# Patient Record
Sex: MALE | Race: WHITE | NOT HISPANIC OR LATINO | URBAN - METROPOLITAN AREA
[De-identification: names, ages, dates, MRNs, and addresses within clinical notes are randomized per-mention and may not be internally consistent; named-entity substitution may affect disease eponyms.]

---

## 2023-04-05 PROBLEM — T14.8XXA SKIN ABRASION: Status: ACTIVE | Noted: 2023-04-05

## 2023-04-05 PROBLEM — J20.9 ACUTE BRONCHITIS: Status: ACTIVE | Noted: 2023-04-05

## 2023-04-05 PROBLEM — S89.92XA INJURY OF LEFT LEG: Status: ACTIVE | Noted: 2023-04-05

## 2023-04-05 PROBLEM — J01.90 ACUTE SINUSITIS: Status: ACTIVE | Noted: 2023-04-05

## 2023-04-05 PROBLEM — N64.4 BREAST TENDERNESS IN MALE: Status: ACTIVE | Noted: 2023-04-05

## 2023-04-05 PROBLEM — B35.4 TINEA CORPORIS: Status: ACTIVE | Noted: 2023-04-05

## 2023-04-05 PROBLEM — E78.5 HYPERLIPIDEMIA: Status: ACTIVE | Noted: 2023-04-05

## 2023-04-05 PROBLEM — N52.9 INABILITY TO ATTAIN ERECTION: Status: ACTIVE | Noted: 2023-04-05

## 2023-04-05 PROBLEM — S80.12XA CONTUSION OF LEFT LOWER LEG: Status: ACTIVE | Noted: 2023-04-05

## 2023-04-05 PROBLEM — I10 BENIGN ESSENTIAL HYPERTENSION: Status: ACTIVE | Noted: 2023-04-05

## 2023-04-05 PROBLEM — E11.9 DIABETES MELLITUS (MULTI): Status: ACTIVE | Noted: 2023-04-05

## 2023-04-05 RX ORDER — LISINOPRIL 5 MG/1
5 TABLET ORAL
COMMUNITY
Start: 2017-12-21 | End: 2023-04-21 | Stop reason: SDUPTHER

## 2023-04-05 RX ORDER — SILDENAFIL 100 MG/1
100 TABLET, FILM COATED ORAL
COMMUNITY
Start: 2015-03-02

## 2023-04-05 RX ORDER — ATORVASTATIN CALCIUM 80 MG/1
1 TABLET, FILM COATED ORAL DAILY
COMMUNITY
Start: 2019-05-20 | End: 2024-03-12 | Stop reason: SDUPTHER

## 2023-04-05 RX ORDER — CICLOPIROX 80 MG/ML
SOLUTION TOPICAL
COMMUNITY
Start: 2021-10-18

## 2023-04-05 RX ORDER — BLOOD-GLUCOSE METER
KIT MISCELLANEOUS
COMMUNITY
Start: 2016-04-01 | End: 2024-03-12 | Stop reason: ALTCHOICE

## 2023-04-05 RX ORDER — CLOTRIMAZOLE AND BETAMETHASONE DIPROPIONATE 10; .64 MG/G; MG/G
CREAM TOPICAL
COMMUNITY
Start: 2021-10-18 | End: 2023-04-06 | Stop reason: ENTERED-IN-ERROR

## 2023-04-05 RX ORDER — METFORMIN HYDROCHLORIDE 1000 MG/1
1000 TABLET ORAL
COMMUNITY
Start: 2012-04-06 | End: 2023-04-21 | Stop reason: SDUPTHER

## 2023-04-05 RX ORDER — INSULIN GLARGINE 100 [IU]/ML
INJECTION, SOLUTION SUBCUTANEOUS NIGHTLY
COMMUNITY
Start: 2019-05-20

## 2023-04-06 ENCOUNTER — OFFICE VISIT (OUTPATIENT)
Dept: PRIMARY CARE | Facility: CLINIC | Age: 58
End: 2023-04-06
Payer: COMMERCIAL

## 2023-04-06 ENCOUNTER — TELEPHONE (OUTPATIENT)
Dept: PRIMARY CARE | Facility: CLINIC | Age: 58
End: 2023-04-06

## 2023-04-06 VITALS
WEIGHT: 184 LBS | SYSTOLIC BLOOD PRESSURE: 134 MMHG | DIASTOLIC BLOOD PRESSURE: 74 MMHG | BODY MASS INDEX: 26.4 KG/M2 | HEART RATE: 72 BPM

## 2023-04-06 DIAGNOSIS — Z00.00 HEALTH CARE MAINTENANCE: ICD-10-CM

## 2023-04-06 DIAGNOSIS — E78.2 MIXED HYPERLIPIDEMIA: ICD-10-CM

## 2023-04-06 DIAGNOSIS — I10 BENIGN ESSENTIAL HYPERTENSION: ICD-10-CM

## 2023-04-06 DIAGNOSIS — Z79.4 TYPE 2 DIABETES MELLITUS WITHOUT COMPLICATION, WITH LONG-TERM CURRENT USE OF INSULIN (MULTI): Primary | ICD-10-CM

## 2023-04-06 DIAGNOSIS — E11.9 TYPE 2 DIABETES MELLITUS WITHOUT COMPLICATION, WITH LONG-TERM CURRENT USE OF INSULIN (MULTI): Primary | ICD-10-CM

## 2023-04-06 PROBLEM — J20.9 ACUTE BRONCHITIS: Status: RESOLVED | Noted: 2023-04-05 | Resolved: 2023-04-06

## 2023-04-06 PROBLEM — S80.12XA CONTUSION OF LEFT LOWER LEG: Status: RESOLVED | Noted: 2023-04-05 | Resolved: 2023-04-06

## 2023-04-06 PROBLEM — J01.90 ACUTE SINUSITIS: Status: RESOLVED | Noted: 2023-04-05 | Resolved: 2023-04-06

## 2023-04-06 PROBLEM — N64.4 BREAST TENDERNESS IN MALE: Status: RESOLVED | Noted: 2023-04-05 | Resolved: 2023-04-06

## 2023-04-06 PROBLEM — B35.4 TINEA CORPORIS: Status: RESOLVED | Noted: 2023-04-05 | Resolved: 2023-04-06

## 2023-04-06 PROBLEM — S89.92XA INJURY OF LEFT LEG: Status: RESOLVED | Noted: 2023-04-05 | Resolved: 2023-04-06

## 2023-04-06 PROBLEM — T14.8XXA SKIN ABRASION: Status: RESOLVED | Noted: 2023-04-05 | Resolved: 2023-04-06

## 2023-04-06 LAB
POC FINGERSTICK BLOOD GLUCOSE: 122 MG/DL (ref 70–100)
POC HEMOGLOBIN A1C: 8 % (ref 4.2–6.5)

## 2023-04-06 PROCEDURE — 3078F DIAST BP <80 MM HG: CPT | Performed by: INTERNAL MEDICINE

## 2023-04-06 PROCEDURE — 3075F SYST BP GE 130 - 139MM HG: CPT | Performed by: INTERNAL MEDICINE

## 2023-04-06 PROCEDURE — 4010F ACE/ARB THERAPY RXD/TAKEN: CPT | Performed by: INTERNAL MEDICINE

## 2023-04-06 PROCEDURE — 90471 IMMUNIZATION ADMIN: CPT | Performed by: INTERNAL MEDICINE

## 2023-04-06 PROCEDURE — 90750 HZV VACC RECOMBINANT IM: CPT | Performed by: INTERNAL MEDICINE

## 2023-04-06 PROCEDURE — 99213 OFFICE O/P EST LOW 20 MIN: CPT | Performed by: INTERNAL MEDICINE

## 2023-04-06 PROCEDURE — 82962 GLUCOSE BLOOD TEST: CPT | Performed by: INTERNAL MEDICINE

## 2023-04-06 PROCEDURE — 83036 HEMOGLOBIN GLYCOSYLATED A1C: CPT | Performed by: INTERNAL MEDICINE

## 2023-04-06 RX ORDER — ALOGLIPTIN 25 MG/1
25 TABLET, FILM COATED ORAL DAILY
Qty: 30 TABLET | Refills: 11 | COMMUNITY
Start: 2023-04-06 | End: 2023-11-02 | Stop reason: SDUPTHER

## 2023-04-06 RX ORDER — EMPAGLIFLOZIN 10 MG/1
1 TABLET, FILM COATED ORAL DAILY
COMMUNITY
Start: 2023-03-20 | End: 2024-02-23 | Stop reason: SDUPTHER

## 2023-04-06 ASSESSMENT — ENCOUNTER SYMPTOMS: ARTHRALGIAS: 1

## 2023-04-06 NOTE — PROGRESS NOTES
Subjective   Patient ID: Thomas Zimmerman is a 57 y.o. male who presents for Diabetes.    Patient presents for follow-up.  He has been compliant with his medications and diet but not exercise.  He reports that his sugars are somewhat elevated at home  He has been complaining of right elbow pain.  He denies any headaches, no dizziness, no chest pain or shortness of breath.  Denies abdominal pain no nausea vomiting or diarrhea.  He reports no other new musculoskeletal complaints.    Diabetes         Review of Systems   Musculoskeletal:  Positive for arthralgias.       Objective   Wt 83.5 kg (184 lb)   BMI 26.40 kg/m²     Physical Exam  Constitutional:       Appearance: Normal appearance.   Cardiovascular:      Rate and Rhythm: Normal rate and regular rhythm.      Heart sounds: No murmur heard.     No gallop.   Pulmonary:      Effort: No respiratory distress.      Breath sounds: No wheezing or rales.   Abdominal:      General: There is no distension.      Palpations: There is no mass.      Tenderness: There is no abdominal tenderness. There is no guarding.   Musculoskeletal:      Right lower leg: No edema.      Left lower leg: No edema.   Neurological:      Mental Status: He is alert.         Assessment/Plan   Diagnoses and all orders for this visit:  Type 2 diabetes mellitus without complication, with long-term current use of insulin (CMS/[facility])- hemoglobin A1c was 8.0.  Encourage compliance with medication, diet and exercise.  We will increase insulin to 26 units.  We will keep appointment with endocrinology.  He will eat 3 meals a day and a snack at night.  Ophthalmology appointment has been done.  Risk of poor diabetic control explained.  -     alogliptin (Nesina) 25 mg tablet; Take 1 tablet (25 mg) by mouth once daily.  Benign essential hypertension-we will follow low-salt diet and exercise  Mixed hyperlipidemia-we will continue with atorvastatin  Other orders  -     Zoster vaccine, recombinant, adult  (SHINGRIX)  Health maintenance-we will give a Shingrix vaccine today.  Cologuard has been done.

## 2023-04-21 DIAGNOSIS — E11.69 TYPE 2 DIABETES MELLITUS WITH OTHER SPECIFIED COMPLICATION, UNSPECIFIED WHETHER LONG TERM INSULIN USE (MULTI): ICD-10-CM

## 2023-04-21 DIAGNOSIS — I10 BENIGN ESSENTIAL HYPERTENSION: ICD-10-CM

## 2023-04-21 RX ORDER — METFORMIN HYDROCHLORIDE 1000 MG/1
1000 TABLET ORAL
Qty: 90 TABLET | Refills: 2 | Status: SHIPPED | OUTPATIENT
Start: 2023-04-21 | End: 2023-04-27 | Stop reason: SDUPTHER

## 2023-04-21 RX ORDER — LISINOPRIL 5 MG/1
5 TABLET ORAL
Qty: 90 TABLET | Refills: 1 | Status: SHIPPED | OUTPATIENT
Start: 2023-04-21 | End: 2023-04-26 | Stop reason: SDUPTHER

## 2023-04-21 RX ORDER — METFORMIN HYDROCHLORIDE 1000 MG/1
1000 TABLET ORAL
Qty: 90 TABLET | Refills: 2 | Status: SHIPPED | OUTPATIENT
Start: 2023-04-21 | End: 2023-04-21

## 2023-04-26 DIAGNOSIS — I10 BENIGN ESSENTIAL HYPERTENSION: ICD-10-CM

## 2023-04-26 RX ORDER — LISINOPRIL 5 MG/1
5 TABLET ORAL
Qty: 90 TABLET | Refills: 1 | Status: SHIPPED | OUTPATIENT
Start: 2023-04-26 | End: 2024-03-12 | Stop reason: SDUPTHER

## 2023-04-27 DIAGNOSIS — E11.69 TYPE 2 DIABETES MELLITUS WITH OTHER SPECIFIED COMPLICATION, UNSPECIFIED WHETHER LONG TERM INSULIN USE (MULTI): ICD-10-CM

## 2023-04-27 RX ORDER — METFORMIN HYDROCHLORIDE 1000 MG/1
1000 TABLET ORAL
Qty: 90 TABLET | Refills: 2 | Status: SHIPPED | OUTPATIENT
Start: 2023-04-27 | End: 2023-04-28 | Stop reason: SDUPTHER

## 2023-04-27 NOTE — TELEPHONE ENCOUNTER
Pharmacy state that Thomas needs his Metformin 1000 sent to the [facility]  pharmacy

## 2023-04-28 DIAGNOSIS — E11.69 TYPE 2 DIABETES MELLITUS WITH OTHER SPECIFIED COMPLICATION, UNSPECIFIED WHETHER LONG TERM INSULIN USE (MULTI): ICD-10-CM

## 2023-04-28 RX ORDER — METFORMIN HYDROCHLORIDE 1000 MG/1
1000 TABLET ORAL
Qty: 90 TABLET | Refills: 2 | Status: SHIPPED | OUTPATIENT
Start: 2023-04-28

## 2023-04-28 RX ORDER — METFORMIN HYDROCHLORIDE 1000 MG/1
1000 TABLET ORAL
Qty: 180 TABLET | Refills: 2 | Status: SHIPPED | OUTPATIENT
Start: 2023-04-28 | End: 2023-05-01 | Stop reason: SDUPTHER

## 2023-05-01 DIAGNOSIS — E11.69 TYPE 2 DIABETES MELLITUS WITH OTHER SPECIFIED COMPLICATION, UNSPECIFIED WHETHER LONG TERM INSULIN USE (MULTI): ICD-10-CM

## 2023-05-01 RX ORDER — METFORMIN HYDROCHLORIDE 1000 MG/1
1000 TABLET ORAL
Qty: 180 TABLET | Refills: 2 | Status: SHIPPED | OUTPATIENT
Start: 2023-05-01 | End: 2024-03-12 | Stop reason: SDUPTHER

## 2023-07-13 ENCOUNTER — OFFICE VISIT (OUTPATIENT)
Dept: PRIMARY CARE | Facility: CLINIC | Age: 58
End: 2023-07-13
Payer: COMMERCIAL

## 2023-07-13 VITALS
TEMPERATURE: 97.3 F | SYSTOLIC BLOOD PRESSURE: 128 MMHG | BODY MASS INDEX: 26.26 KG/M2 | WEIGHT: 183 LBS | HEART RATE: 72 BPM | DIASTOLIC BLOOD PRESSURE: 72 MMHG

## 2023-07-13 DIAGNOSIS — I10 BENIGN ESSENTIAL HYPERTENSION: ICD-10-CM

## 2023-07-13 DIAGNOSIS — E78.2 MIXED HYPERLIPIDEMIA: Primary | ICD-10-CM

## 2023-07-13 DIAGNOSIS — E11.9 TYPE 2 DIABETES MELLITUS WITHOUT COMPLICATION, WITHOUT LONG-TERM CURRENT USE OF INSULIN (MULTI): ICD-10-CM

## 2023-07-13 LAB — POC HEMOGLOBIN A1C: 7 % (ref 4.2–6.5)

## 2023-07-13 PROCEDURE — 3078F DIAST BP <80 MM HG: CPT | Performed by: INTERNAL MEDICINE

## 2023-07-13 PROCEDURE — 4010F ACE/ARB THERAPY RXD/TAKEN: CPT | Performed by: INTERNAL MEDICINE

## 2023-07-13 PROCEDURE — 1036F TOBACCO NON-USER: CPT | Performed by: INTERNAL MEDICINE

## 2023-07-13 PROCEDURE — 99213 OFFICE O/P EST LOW 20 MIN: CPT | Performed by: INTERNAL MEDICINE

## 2023-07-13 PROCEDURE — 3074F SYST BP LT 130 MM HG: CPT | Performed by: INTERNAL MEDICINE

## 2023-07-13 PROCEDURE — 83036 HEMOGLOBIN GLYCOSYLATED A1C: CPT | Performed by: INTERNAL MEDICINE

## 2023-07-13 SDOH — HEALTH STABILITY: PHYSICAL HEALTH: ON AVERAGE, HOW MANY MINUTES DO YOU ENGAGE IN EXERCISE AT THIS LEVEL?: 20 MIN

## 2023-07-13 SDOH — HEALTH STABILITY: PHYSICAL HEALTH: ON AVERAGE, HOW MANY DAYS PER WEEK DO YOU ENGAGE IN MODERATE TO STRENUOUS EXERCISE (LIKE A BRISK WALK)?: 1 DAY

## 2023-07-13 ASSESSMENT — ENCOUNTER SYMPTOMS
CHEST TIGHTNESS: 0
ANAL BLEEDING: 0
FREQUENCY: 0
TROUBLE SWALLOWING: 0
POLYDIPSIA: 0
JOINT SWELLING: 0
NAUSEA: 0
COUGH: 0
SINUS PRESSURE: 0
CHILLS: 0
CONSTIPATION: 0
WEAKNESS: 0
WHEEZING: 0
SLEEP DISTURBANCE: 0
WOUND: 0
FLANK PAIN: 0
CHOKING: 0
HEADACHES: 0
HEMATURIA: 0
EYE REDNESS: 0
STRIDOR: 0
DYSURIA: 0
DIAPHORESIS: 0
SORE THROAT: 0
DIFFICULTY URINATING: 0
SINUS PAIN: 0
RHINORRHEA: 0
BLOOD IN STOOL: 0
ABDOMINAL PAIN: 0
ADENOPATHY: 0
EYE ITCHING: 0
ACTIVITY CHANGE: 0
FATIGUE: 0
COLOR CHANGE: 0
VOMITING: 0
EYE PAIN: 0
LIGHT-HEADEDNESS: 0
SEIZURES: 0
APPETITE CHANGE: 0
FACIAL ASYMMETRY: 0
PALPITATIONS: 0
ARTHRALGIAS: 0
PHOTOPHOBIA: 0
NUMBNESS: 0
NECK PAIN: 0
DIZZINESS: 0
DIARRHEA: 0
SPEECH DIFFICULTY: 0
RECTAL PAIN: 0
MYALGIAS: 0
BACK PAIN: 0
BRUISES/BLEEDS EASILY: 0
NECK STIFFNESS: 0
SHORTNESS OF BREATH: 0
FACIAL SWELLING: 0
EYE DISCHARGE: 0
VOICE CHANGE: 0
TREMORS: 0
POLYPHAGIA: 0
ABDOMINAL DISTENTION: 0

## 2023-07-13 ASSESSMENT — PATIENT HEALTH QUESTIONNAIRE - PHQ9
1. LITTLE INTEREST OR PLEASURE IN DOING THINGS: NOT AT ALL
SUM OF ALL RESPONSES TO PHQ9 QUESTIONS 1 & 2: 0
2. FEELING DOWN, DEPRESSED OR HOPELESS: NOT AT ALL

## 2023-07-13 ASSESSMENT — LIFESTYLE VARIABLES
HOW OFTEN DO YOU HAVE A DRINK CONTAINING ALCOHOL: MONTHLY OR LESS
HOW MANY STANDARD DRINKS CONTAINING ALCOHOL DO YOU HAVE ON A TYPICAL DAY: 1 OR 2
AUDIT-C TOTAL SCORE: 1
SKIP TO QUESTIONS 9-10: 1
HOW OFTEN DO YOU HAVE SIX OR MORE DRINKS ON ONE OCCASION: NEVER

## 2023-07-13 NOTE — PROGRESS NOTES
Subjective   Patient ID: Thomas Zimmerman is a 57 y.o. male who presents for Follow-up (Pt present today for 3 month follow up. ls).    Patient presents for follow-up.  He has been compliant with his medications, diet but not exercise.  He overall feels well.  He denies any headaches, no dizziness, no sinus problems, no chest pain or shortness of breath.  Denies abdominal pain no nausea vomiting or diarrhea.  He reports no new musculoskeletal complaints.         Review of Systems   Constitutional:  Negative for activity change, appetite change, chills, diaphoresis and fatigue.   HENT:  Negative for congestion, dental problem, drooling, ear discharge, ear pain, facial swelling, hearing loss, mouth sores, nosebleeds, postnasal drip, rhinorrhea, sinus pressure, sinus pain, sneezing, sore throat, tinnitus, trouble swallowing and voice change.    Eyes:  Negative for photophobia, pain, discharge, redness, itching and visual disturbance.   Respiratory:  Negative for cough, choking, chest tightness, shortness of breath, wheezing and stridor.    Cardiovascular:  Negative for chest pain, palpitations and leg swelling.   Gastrointestinal:  Negative for abdominal distention, abdominal pain, anal bleeding, blood in stool, constipation, diarrhea, nausea, rectal pain and vomiting.   Endocrine: Negative for cold intolerance, heat intolerance, polydipsia, polyphagia and polyuria.   Genitourinary:  Negative for decreased urine volume, difficulty urinating, dysuria, enuresis, flank pain, frequency, genital sores, hematuria and urgency.   Musculoskeletal:  Negative for arthralgias, back pain, gait problem, joint swelling, myalgias, neck pain and neck stiffness.   Skin:  Negative for color change, pallor, rash and wound.   Neurological:  Negative for dizziness, tremors, seizures, syncope, facial asymmetry, speech difficulty, weakness, light-headedness, numbness and headaches.   Hematological:  Negative for adenopathy. Does not bruise/bleed  easily.   Psychiatric/Behavioral:  Negative for sleep disturbance.        Objective   Temp 36.3 °C (97.3 °F)   Wt 83 kg (183 lb)   BMI 26.26 kg/m²     Physical Exam  Constitutional:       Appearance: Normal appearance.   Cardiovascular:      Rate and Rhythm: Normal rate and regular rhythm.      Heart sounds: No murmur heard.     No gallop.   Pulmonary:      Effort: No respiratory distress.      Breath sounds: No wheezing or rales.   Abdominal:      General: There is no distension.      Palpations: There is no mass.      Tenderness: There is no abdominal tenderness. There is no guarding.   Musculoskeletal:      Right lower leg: No edema.      Left lower leg: No edema.   Neurological:      Mental Status: He is alert.         Assessment/Plan   Diagnoses and all orders for this visit:  Mixed hyperlipidemia--we will recheck lipids at next visit.  We will continue with statin  Benign essential hypertension-low-salt diet and exercise  Type 2 diabetes mellitus without complication, without long-term current use of insulin (CMS/[facility])-hemoglobin A1c was 7.0.  Improved.  Ophthalmology appointment has been done.  Health maintenance-Cologuard has been done..  Immunizations are up-to-date.  Dental appointment has been done.

## 2023-10-16 ENCOUNTER — LAB (OUTPATIENT)
Dept: LAB | Facility: LAB | Age: 58
End: 2023-10-16
Payer: COMMERCIAL

## 2023-10-16 ENCOUNTER — OFFICE VISIT (OUTPATIENT)
Dept: PRIMARY CARE | Facility: CLINIC | Age: 58
End: 2023-10-16
Payer: COMMERCIAL

## 2023-10-16 VITALS
SYSTOLIC BLOOD PRESSURE: 124 MMHG | WEIGHT: 185 LBS | BODY MASS INDEX: 26.54 KG/M2 | HEART RATE: 72 BPM | DIASTOLIC BLOOD PRESSURE: 74 MMHG

## 2023-10-16 DIAGNOSIS — E11.9 TYPE 2 DIABETES MELLITUS WITHOUT COMPLICATION, WITHOUT LONG-TERM CURRENT USE OF INSULIN (MULTI): ICD-10-CM

## 2023-10-16 DIAGNOSIS — Z23 NEED FOR INFLUENZA VACCINATION: ICD-10-CM

## 2023-10-16 DIAGNOSIS — Z00.00 HEALTH CARE MAINTENANCE: ICD-10-CM

## 2023-10-16 DIAGNOSIS — Z00.00 HEALTH CARE MAINTENANCE: Primary | ICD-10-CM

## 2023-10-16 DIAGNOSIS — I10 BENIGN ESSENTIAL HYPERTENSION: ICD-10-CM

## 2023-10-16 DIAGNOSIS — E78.2 MIXED HYPERLIPIDEMIA: ICD-10-CM

## 2023-10-16 LAB
25(OH)D3 SERPL-MCNC: 74 NG/ML (ref 30–100)
ALBUMIN SERPL BCP-MCNC: 4.4 G/DL (ref 3.4–5)
ALP SERPL-CCNC: 97 U/L (ref 33–120)
ALT SERPL W P-5'-P-CCNC: 25 U/L (ref 10–52)
ANION GAP SERPL CALC-SCNC: 15 MMOL/L (ref 10–20)
APPEARANCE UR: CLEAR
AST SERPL W P-5'-P-CCNC: 14 U/L (ref 9–39)
BASOPHILS # BLD AUTO: 0.04 X10*3/UL (ref 0–0.1)
BASOPHILS NFR BLD AUTO: 0.5 %
BILIRUB SERPL-MCNC: 0.4 MG/DL (ref 0–1.2)
BILIRUB UR STRIP.AUTO-MCNC: NEGATIVE MG/DL
BUN SERPL-MCNC: 20 MG/DL (ref 6–23)
CALCIUM SERPL-MCNC: 9.8 MG/DL (ref 8.6–10.6)
CHLORIDE SERPL-SCNC: 103 MMOL/L (ref 98–107)
CHOLEST SERPL-MCNC: 127 MG/DL (ref 0–199)
CHOLESTEROL/HDL RATIO: 3.1
CO2 SERPL-SCNC: 27 MMOL/L (ref 21–32)
COLOR UR: YELLOW
CREAT SERPL-MCNC: 0.82 MG/DL (ref 0.5–1.3)
CREAT UR-MCNC: 89 MG/DL (ref 20–370)
EOSINOPHIL # BLD AUTO: 0.12 X10*3/UL (ref 0–0.7)
EOSINOPHIL NFR BLD AUTO: 1.6 %
ERYTHROCYTE [DISTWIDTH] IN BLOOD BY AUTOMATED COUNT: 12.1 % (ref 11.5–14.5)
EST. AVERAGE GLUCOSE BLD GHB EST-MCNC: 160 MG/DL
GFR SERPL CREATININE-BSD FRML MDRD: >90 ML/MIN/1.73M*2
GLUCOSE SERPL-MCNC: 111 MG/DL (ref 74–99)
GLUCOSE UR STRIP.AUTO-MCNC: ABNORMAL MG/DL
HBA1C MFR BLD: 7.2 %
HCT VFR BLD AUTO: 52.1 % (ref 41–52)
HDLC SERPL-MCNC: 41.2 MG/DL
HGB BLD-MCNC: 16.6 G/DL (ref 13.5–17.5)
IMM GRANULOCYTES # BLD AUTO: 0.03 X10*3/UL (ref 0–0.7)
IMM GRANULOCYTES NFR BLD AUTO: 0.4 % (ref 0–0.9)
KETONES UR STRIP.AUTO-MCNC: NEGATIVE MG/DL
LDLC SERPL CALC-MCNC: 63 MG/DL
LEUKOCYTE ESTERASE UR QL STRIP.AUTO: NEGATIVE
LYMPHOCYTES # BLD AUTO: 1.58 X10*3/UL (ref 1.2–4.8)
LYMPHOCYTES NFR BLD AUTO: 20.9 %
MCH RBC QN AUTO: 30.3 PG (ref 26–34)
MCHC RBC AUTO-ENTMCNC: 31.9 G/DL (ref 32–36)
MCV RBC AUTO: 95 FL (ref 80–100)
MICROALBUMIN UR-MCNC: <7 MG/L
MICROALBUMIN/CREAT UR: NORMAL MG/G{CREAT}
MONOCYTES # BLD AUTO: 0.84 X10*3/UL (ref 0.1–1)
MONOCYTES NFR BLD AUTO: 11.1 %
NEUTROPHILS # BLD AUTO: 4.95 X10*3/UL (ref 1.2–7.7)
NEUTROPHILS NFR BLD AUTO: 65.5 %
NITRITE UR QL STRIP.AUTO: NEGATIVE
NON HDL CHOLESTEROL: 86 MG/DL (ref 0–149)
NRBC BLD-RTO: 0 /100 WBCS (ref 0–0)
PH UR STRIP.AUTO: 5 [PH]
PLATELET # BLD AUTO: 227 X10*3/UL (ref 150–450)
PMV BLD AUTO: 11.8 FL (ref 7.5–11.5)
POTASSIUM SERPL-SCNC: 4.2 MMOL/L (ref 3.5–5.3)
PROT SERPL-MCNC: 6.5 G/DL (ref 6.4–8.2)
PROT UR STRIP.AUTO-MCNC: NEGATIVE MG/DL
PSA SERPL-MCNC: 1.41 NG/ML
RBC # BLD AUTO: 5.47 X10*6/UL (ref 4.5–5.9)
RBC # UR STRIP.AUTO: NEGATIVE /UL
SODIUM SERPL-SCNC: 141 MMOL/L (ref 136–145)
SP GR UR STRIP.AUTO: 1.03
TRIGL SERPL-MCNC: 112 MG/DL (ref 0–149)
TSH SERPL-ACNC: 1.58 MIU/L (ref 0.44–3.98)
URATE SERPL-MCNC: 4 MG/DL (ref 4–7.5)
UROBILINOGEN UR STRIP.AUTO-MCNC: <2 MG/DL
VLDL: 22 MG/DL (ref 0–40)
WBC # BLD AUTO: 7.6 X10*3/UL (ref 4.4–11.3)

## 2023-10-16 PROCEDURE — 90686 IIV4 VACC NO PRSV 0.5 ML IM: CPT | Performed by: INTERNAL MEDICINE

## 2023-10-16 PROCEDURE — 85025 COMPLETE CBC W/AUTO DIFF WBC: CPT

## 2023-10-16 PROCEDURE — 3074F SYST BP LT 130 MM HG: CPT | Performed by: INTERNAL MEDICINE

## 2023-10-16 PROCEDURE — 4010F ACE/ARB THERAPY RXD/TAKEN: CPT | Performed by: INTERNAL MEDICINE

## 2023-10-16 PROCEDURE — 3078F DIAST BP <80 MM HG: CPT | Performed by: INTERNAL MEDICINE

## 2023-10-16 PROCEDURE — 83036 HEMOGLOBIN GLYCOSYLATED A1C: CPT

## 2023-10-16 PROCEDURE — 81003 URINALYSIS AUTO W/O SCOPE: CPT

## 2023-10-16 PROCEDURE — 3048F LDL-C <100 MG/DL: CPT | Performed by: INTERNAL MEDICINE

## 2023-10-16 PROCEDURE — 80061 LIPID PANEL: CPT

## 2023-10-16 PROCEDURE — 82570 ASSAY OF URINE CREATININE: CPT

## 2023-10-16 PROCEDURE — 84550 ASSAY OF BLOOD/URIC ACID: CPT

## 2023-10-16 PROCEDURE — 82306 VITAMIN D 25 HYDROXY: CPT

## 2023-10-16 PROCEDURE — 84443 ASSAY THYROID STIM HORMONE: CPT

## 2023-10-16 PROCEDURE — 90471 IMMUNIZATION ADMIN: CPT | Performed by: INTERNAL MEDICINE

## 2023-10-16 PROCEDURE — 1036F TOBACCO NON-USER: CPT | Performed by: INTERNAL MEDICINE

## 2023-10-16 PROCEDURE — 84153 ASSAY OF PSA TOTAL: CPT

## 2023-10-16 PROCEDURE — 3051F HG A1C>EQUAL 7.0%<8.0%: CPT | Performed by: INTERNAL MEDICINE

## 2023-10-16 PROCEDURE — 82043 UR ALBUMIN QUANTITATIVE: CPT

## 2023-10-16 PROCEDURE — 36415 COLL VENOUS BLD VENIPUNCTURE: CPT

## 2023-10-16 PROCEDURE — 3062F POS MACROALBUMINURIA REV: CPT | Performed by: INTERNAL MEDICINE

## 2023-10-16 PROCEDURE — 99213 OFFICE O/P EST LOW 20 MIN: CPT | Performed by: INTERNAL MEDICINE

## 2023-10-16 PROCEDURE — 80053 COMPREHEN METABOLIC PANEL: CPT

## 2023-10-16 ASSESSMENT — ENCOUNTER SYMPTOMS
EYE ITCHING: 0
WEAKNESS: 0
POLYPHAGIA: 0
LIGHT-HEADEDNESS: 0
COUGH: 0
NAUSEA: 0
FACIAL SWELLING: 0
RECTAL PAIN: 0
JOINT SWELLING: 0
EYE REDNESS: 0
ARTHRALGIAS: 0
DYSURIA: 0
VOICE CHANGE: 0
FACIAL ASYMMETRY: 0
ADENOPATHY: 0
EYE PAIN: 0
POLYDIPSIA: 0
NUMBNESS: 0
FATIGUE: 0
FREQUENCY: 0
HEMATURIA: 0
WHEEZING: 0
PALPITATIONS: 0
SEIZURES: 0
DIAPHORESIS: 0
VOMITING: 0
SPEECH DIFFICULTY: 0
CHOKING: 0
TREMORS: 0
ABDOMINAL DISTENTION: 0
CONSTIPATION: 0
APPETITE CHANGE: 0
TROUBLE SWALLOWING: 0
DIZZINESS: 0
SINUS PRESSURE: 0
RHINORRHEA: 0
COLOR CHANGE: 0
NECK PAIN: 0
DIARRHEA: 0
BLOOD IN STOOL: 0
HEADACHES: 0
SINUS PAIN: 0
BRUISES/BLEEDS EASILY: 0
SLEEP DISTURBANCE: 0
STRIDOR: 0
DIFFICULTY URINATING: 0
ABDOMINAL PAIN: 0
BACK PAIN: 0
WOUND: 0
FLANK PAIN: 0
ANAL BLEEDING: 0
CHEST TIGHTNESS: 0
ACTIVITY CHANGE: 0
NECK STIFFNESS: 0
SHORTNESS OF BREATH: 0
CHILLS: 0
MYALGIAS: 0
PHOTOPHOBIA: 0
EYE DISCHARGE: 0
SORE THROAT: 0

## 2023-10-16 NOTE — PROGRESS NOTES
Subjective   Patient ID: Thomas Zimmerman is a 58 y.o. male who presents for No chief complaint on file..    Patient presents for follow-up.  He has been compliant with his medications, diet but not exercise.  He overall feels well.  He denies any headaches, no dizziness, no sinus problems, no chest pain or shortness of breath.  He denies abdominal pain no nausea vomiting or diarrhea.  He reports no new musculoskeletal complaints.         Review of Systems   Constitutional:  Negative for activity change, appetite change, chills, diaphoresis and fatigue.   HENT:  Negative for congestion, dental problem, drooling, ear discharge, ear pain, facial swelling, hearing loss, mouth sores, nosebleeds, postnasal drip, rhinorrhea, sinus pressure, sinus pain, sneezing, sore throat, tinnitus, trouble swallowing and voice change.    Eyes:  Negative for photophobia, pain, discharge, redness, itching and visual disturbance.   Respiratory:  Negative for cough, choking, chest tightness, shortness of breath, wheezing and stridor.    Cardiovascular:  Negative for chest pain, palpitations and leg swelling.   Gastrointestinal:  Negative for abdominal distention, abdominal pain, anal bleeding, blood in stool, constipation, diarrhea, nausea, rectal pain and vomiting.   Endocrine: Negative for cold intolerance, heat intolerance, polydipsia, polyphagia and polyuria.   Genitourinary:  Negative for decreased urine volume, difficulty urinating, dysuria, enuresis, flank pain, frequency, genital sores, hematuria and urgency.   Musculoskeletal:  Negative for arthralgias, back pain, gait problem, joint swelling, myalgias, neck pain and neck stiffness.   Skin:  Negative for color change, pallor, rash and wound.   Neurological:  Negative for dizziness, tremors, seizures, syncope, facial asymmetry, speech difficulty, weakness, light-headedness, numbness and headaches.   Hematological:  Negative for adenopathy. Does not bruise/bleed easily.    Psychiatric/Behavioral:  Negative for sleep disturbance.        Objective   There were no vitals taken for this visit.    Physical Exam  Constitutional:       Appearance: Normal appearance.   Cardiovascular:      Rate and Rhythm: Normal rate and regular rhythm.      Heart sounds: No murmur heard.     No gallop.   Pulmonary:      Effort: No respiratory distress.      Breath sounds: No wheezing or rales.   Abdominal:      General: There is no distension.      Palpations: There is no mass.      Tenderness: There is no abdominal tenderness. There is no guarding.   Musculoskeletal:      Right lower leg: No edema.      Left lower leg: No edema.   Neurological:      Mental Status: He is alert.         Assessment/Plan   Diagnoses and all orders for this visit:  Health care maintenance-we will give a flu shot today..  Cologuard has been done.  -     Albumin , Urine Random; Future  -     Vitamin D 25-Hydroxy,Total (for eval of Vitamin D levels); Future  -     CBC and Auto Differential; Future  -     Comprehensive Metabolic Panel; Future  -     Prostate Specific Antigen; Future  -     TSH with reflex to Free T4 if abnormal; Future  -     Uric Acid; Future  -     Urinalysis with Reflex Microscopic; Future  -     Lipid Panel; Future  -     Hemoglobin A1c; Future  Type 2 diabetes mellitus without complication, without long-term current use of insulin (CMS/[facility])-we will check hemoglobin A1c.  Ophthalmology appointment is pending  -     Hemoglobin A1c; Future  Need for influenza vaccination  -     Flu vaccine (IIV4) age 6 months and greater, preservative free  Mixed hyperlipidemia-check a fast lipid profile  Benign essential hypertension-stable on present medication

## 2023-10-16 NOTE — PATIENT INSTRUCTIONS
Please take medication as prescribed.  Follow-up in 3 months.  Obtain fasting blood work and urine

## 2023-11-02 DIAGNOSIS — E11.9 TYPE 2 DIABETES MELLITUS WITHOUT COMPLICATION, WITH LONG-TERM CURRENT USE OF INSULIN (MULTI): ICD-10-CM

## 2023-11-02 DIAGNOSIS — Z79.4 TYPE 2 DIABETES MELLITUS WITHOUT COMPLICATION, WITH LONG-TERM CURRENT USE OF INSULIN (MULTI): ICD-10-CM

## 2023-11-06 RX ORDER — ALOGLIPTIN 25 MG/1
25 TABLET, FILM COATED ORAL DAILY
Qty: 90 TABLET | Refills: 3 | Status: SHIPPED | OUTPATIENT
Start: 2023-11-06 | End: 2024-11-05

## 2023-11-14 ENCOUNTER — TELEMEDICINE (OUTPATIENT)
Dept: ENDOCRINOLOGY | Facility: HOSPITAL | Age: 58
End: 2023-11-14
Payer: COMMERCIAL

## 2023-11-14 DIAGNOSIS — E11.9 TYPE 2 DIABETES MELLITUS WITHOUT COMPLICATION, WITHOUT LONG-TERM CURRENT USE OF INSULIN (MULTI): Primary | ICD-10-CM

## 2023-11-14 DIAGNOSIS — E78.2 MIXED HYPERLIPIDEMIA: ICD-10-CM

## 2023-11-14 PROCEDURE — 99213 OFFICE O/P EST LOW 20 MIN: CPT | Performed by: STUDENT IN AN ORGANIZED HEALTH CARE EDUCATION/TRAINING PROGRAM

## 2023-11-14 NOTE — PROGRESS NOTES
"Patient coming in for follow up for T2DM    Subjective   Addy Zimmerman \"Thomas\" is a 58 y.o. male who presents for follow up for Type 2 diabetes mellitus.     Lab Results   Component Value Date    HGBA1C 7.2 (H) 10/16/2023      Mr. Zimmerman is a 58 year old M with T2DM c/b retinopathy, HLD coming in for follow up  date of diagnosis: 15 years. Date of last HbA1c: 2023 and results: 8.1%.   Interval History:  A1c is 8% in april per patient so lantus was increased and BG improved   Saw podiatrist on terbinafine 3 months fungal infection improved signifcantly   Current DM Regimen:. Lantus 24 units (Increased from 22 *in December)  Alogliptin 25 mg  Jardiance 10 mg  Metformin 1000 mg BID.   Atorvastatin 80 mg  Lisinopril 5 mg  Glucose Ranges: In am: Bs low 100s  No low BG   Sometimes have some candy but in general stable  Diabetes Surveillance: Eye exam: 2024 scheduled Has retinopathy mild and has been following with ophthalmology twice yearly. Vision improved  Foot exam/podiatrist: Oct 2021. Not yet  Cardiovascular: no coronary artery bypass graft and no coronary artery disease.   Renal: no nephropathy and no end stage renal disease. Jardiance   Neurologic: no neuropathy.   Following with podiatry  No weight gain   No abdominal pain  No nausea or vomiting  No constipation or vomiting  No chest pain or shortness   Review of Systems  all pertinent systems reviewed and are otherwise negative   Objective   There were no vitals taken for this visit.  Physical Exam  Not done since virtual visit  Lab Review  Glucose (mg/dL)   Date Value   10/16/2023 111 (H)   2022 120 (H)   2022 172 (H)   2021 160 (H)     POC HEMOGLOBIN A1c (%)   Date Value   2023 7.0 (A)   2023 8.0 (A)     Hemoglobin A1C (%)   Date Value   10/16/2023 7.2 (H)   2022 7.0 (A)   2022 8.9 (A)   2021 7.6     Bicarbonate (mmol/L)   Date Value   10/16/2023 27   2022 27   20222021 " "    Urea Nitrogen (mg/dL)   Date Value   10/16/2023 20   2022 26 (H)   2022 17   2021 26 (H)     Creatinine (mg/dL)   Date Value   10/16/2023 0.82   2022 0.94   2022 0.88   2021 1.03     Lab Results   Component Value Date    CHOL 127 10/16/2023    CHOL 144 2022    CHOL 89 2022     Lab Results   Component Value Date    HDL 41.2 10/16/2023    HDL 47.6 2022    HDL 38.5 (A) 2022     Lab Results   Component Value Date    LDLCALC 63 10/16/2023     Lab Results   Component Value Date    TRIG 112 10/16/2023    TRIG 86 2022    TRIG 80 2022     No components found for: \"CHOLHDL\"   Lab Results   Component Value Date    TSH 1.58 10/16/2023       Assessment/Plan     Mr. Zimmerman is a 58 year old M with T2DM c/b retinopathy, HLD coming in for follow up  date of diagnosis: 15 years. Date of last HbA1c: 2023 and results: 8.1%.   Interval History:  A1c is 8% in april per patient so lantus was increased and BG improved   Saw podiatrist on terbinafine 3 months fungal infection improved signifcantly   Current DM Regimen:. Lantus 24 units (Increased from 22 *in December)  Alogliptin 25 mg  Jardiance 10 mg  Metformin 1000 mg BID.   Atorvastatin 80 mg  Lisinopril 5 mg  Glucose Ranges: In am: Bs low 100s  No low BG   Sometimes have some candy but in general stable  Diabetes Surveillance: Eye exam: 2024 scheduled Has retinopathy mild and has been following with ophthalmology twice yearly. Vision improved  Foot exam/podiatrist: Following with podiatry  Cardiovascular: no coronary artery bypass graft and no coronary artery disease.   Renal: no nephropathy and no end stage renal disease. Jardiance   Neurologic: no neuropathy.   Plan:  Continue same meds.  BG well controlled.  We advised patient to continue to check 1-2 times and to continue to watch diet and exercise.  Follow with ophthalmology and podiatry  Continue Atorvastatin and lisinopril  Labs before next " apt    RTC in March

## 2024-02-12 ENCOUNTER — APPOINTMENT (OUTPATIENT)
Dept: PRIMARY CARE | Facility: CLINIC | Age: 59
End: 2024-02-12
Payer: COMMERCIAL

## 2024-02-22 ENCOUNTER — TELEPHONE (OUTPATIENT)
Dept: PRIMARY CARE | Facility: HOSPITAL | Age: 59
End: 2024-02-22

## 2024-02-22 NOTE — TELEPHONE ENCOUNTER
Patient is asking for a refill on Jardiance sent over to [facility]. Patient has been calling for over a week and is out of the medication. Please submit a new Prescription to pharmacy.

## 2024-02-23 DIAGNOSIS — E11.9 TYPE 2 DIABETES MELLITUS WITHOUT COMPLICATION, WITHOUT LONG-TERM CURRENT USE OF INSULIN (MULTI): ICD-10-CM

## 2024-02-23 RX ORDER — EMPAGLIFLOZIN 10 MG/1
TABLET, FILM COATED ORAL
Qty: 90 TABLET | Refills: 3 | Status: SHIPPED | OUTPATIENT
Start: 2024-02-23 | End: 2024-03-12 | Stop reason: ALTCHOICE

## 2024-03-07 DIAGNOSIS — N52.9 INABILITY TO ATTAIN ERECTION: Primary | ICD-10-CM

## 2024-03-08 PROCEDURE — RXMED WILLOW AMBULATORY MEDICATION CHARGE

## 2024-03-08 RX ORDER — SILDENAFIL 100 MG/1
TABLET, FILM COATED ORAL
Qty: 10 TABLET | Refills: 14 | Status: SHIPPED | OUTPATIENT
Start: 2024-03-08 | End: 2025-03-07

## 2024-03-12 ENCOUNTER — PHARMACY VISIT (OUTPATIENT)
Dept: PHARMACY | Facility: CLINIC | Age: 59
End: 2024-03-12
Payer: MEDICARE

## 2024-03-12 ENCOUNTER — LAB (OUTPATIENT)
Dept: LAB | Facility: LAB | Age: 59
End: 2024-03-12
Payer: COMMERCIAL

## 2024-03-12 ENCOUNTER — OFFICE VISIT (OUTPATIENT)
Dept: PRIMARY CARE | Facility: CLINIC | Age: 59
End: 2024-03-12
Payer: COMMERCIAL

## 2024-03-12 ENCOUNTER — OFFICE VISIT (OUTPATIENT)
Dept: ENDOCRINOLOGY | Facility: HOSPITAL | Age: 59
End: 2024-03-12
Payer: COMMERCIAL

## 2024-03-12 VITALS
OXYGEN SATURATION: 97 % | TEMPERATURE: 97.3 F | BODY MASS INDEX: 27.85 KG/M2 | HEART RATE: 77 BPM | DIASTOLIC BLOOD PRESSURE: 69 MMHG | HEIGHT: 69 IN | WEIGHT: 188 LBS | SYSTOLIC BLOOD PRESSURE: 114 MMHG

## 2024-03-12 VITALS
BODY MASS INDEX: 26.11 KG/M2 | WEIGHT: 182 LBS | HEART RATE: 84 BPM | SYSTOLIC BLOOD PRESSURE: 124 MMHG | DIASTOLIC BLOOD PRESSURE: 74 MMHG

## 2024-03-12 DIAGNOSIS — I10 BENIGN ESSENTIAL HYPERTENSION: ICD-10-CM

## 2024-03-12 DIAGNOSIS — E78.2 MIXED HYPERLIPIDEMIA: Primary | ICD-10-CM

## 2024-03-12 DIAGNOSIS — Z00.00 HEALTH CARE MAINTENANCE: ICD-10-CM

## 2024-03-12 DIAGNOSIS — E11.9 TYPE 2 DIABETES MELLITUS WITHOUT COMPLICATION, WITHOUT LONG-TERM CURRENT USE OF INSULIN (MULTI): ICD-10-CM

## 2024-03-12 DIAGNOSIS — E11.69 TYPE 2 DIABETES MELLITUS WITH OTHER SPECIFIED COMPLICATION, UNSPECIFIED WHETHER LONG TERM INSULIN USE (MULTI): ICD-10-CM

## 2024-03-12 LAB
ALBUMIN SERPL BCP-MCNC: 4.5 G/DL (ref 3.4–5)
ANION GAP SERPL CALC-SCNC: 12 MMOL/L (ref 10–20)
BASOPHILS # BLD AUTO: 0.04 X10*3/UL (ref 0–0.1)
BASOPHILS NFR BLD AUTO: 0.4 %
BUN SERPL-MCNC: 18 MG/DL (ref 6–23)
CALCIUM SERPL-MCNC: 10 MG/DL (ref 8.6–10.6)
CHLORIDE SERPL-SCNC: 103 MMOL/L (ref 98–107)
CO2 SERPL-SCNC: 30 MMOL/L (ref 21–32)
CREAT SERPL-MCNC: 0.89 MG/DL (ref 0.5–1.3)
EGFRCR SERPLBLD CKD-EPI 2021: >90 ML/MIN/1.73M*2
EOSINOPHIL # BLD AUTO: 0.11 X10*3/UL (ref 0–0.7)
EOSINOPHIL NFR BLD AUTO: 1.1 %
ERYTHROCYTE [DISTWIDTH] IN BLOOD BY AUTOMATED COUNT: 12.1 % (ref 11.5–14.5)
EST. AVERAGE GLUCOSE BLD GHB EST-MCNC: 177 MG/DL
GLUCOSE SERPL-MCNC: 122 MG/DL (ref 74–99)
HBA1C MFR BLD: 7.8 %
HCT VFR BLD AUTO: 51.6 % (ref 41–52)
HGB BLD-MCNC: 16.6 G/DL (ref 13.5–17.5)
IMM GRANULOCYTES # BLD AUTO: 0.02 X10*3/UL (ref 0–0.7)
IMM GRANULOCYTES NFR BLD AUTO: 0.2 % (ref 0–0.9)
LYMPHOCYTES # BLD AUTO: 1.72 X10*3/UL (ref 1.2–4.8)
LYMPHOCYTES NFR BLD AUTO: 17.9 %
MCH RBC QN AUTO: 29.3 PG (ref 26–34)
MCHC RBC AUTO-ENTMCNC: 32.2 G/DL (ref 32–36)
MCV RBC AUTO: 91 FL (ref 80–100)
MONOCYTES # BLD AUTO: 0.93 X10*3/UL (ref 0.1–1)
MONOCYTES NFR BLD AUTO: 9.7 %
NEUTROPHILS # BLD AUTO: 6.78 X10*3/UL (ref 1.2–7.7)
NEUTROPHILS NFR BLD AUTO: 70.7 %
NRBC BLD-RTO: 0 /100 WBCS (ref 0–0)
PHOSPHATE SERPL-MCNC: 4.7 MG/DL (ref 2.5–4.9)
PLATELET # BLD AUTO: 228 X10*3/UL (ref 150–450)
POTASSIUM SERPL-SCNC: 4.3 MMOL/L (ref 3.5–5.3)
PSA SERPL-MCNC: 1.87 NG/ML
RBC # BLD AUTO: 5.66 X10*6/UL (ref 4.5–5.9)
SODIUM SERPL-SCNC: 141 MMOL/L (ref 136–145)
WBC # BLD AUTO: 9.6 X10*3/UL (ref 4.4–11.3)

## 2024-03-12 PROCEDURE — 80069 RENAL FUNCTION PANEL: CPT

## 2024-03-12 PROCEDURE — 3074F SYST BP LT 130 MM HG: CPT | Performed by: STUDENT IN AN ORGANIZED HEALTH CARE EDUCATION/TRAINING PROGRAM

## 2024-03-12 PROCEDURE — 99215 OFFICE O/P EST HI 40 MIN: CPT | Performed by: STUDENT IN AN ORGANIZED HEALTH CARE EDUCATION/TRAINING PROGRAM

## 2024-03-12 PROCEDURE — 84153 ASSAY OF PSA TOTAL: CPT

## 2024-03-12 PROCEDURE — 3074F SYST BP LT 130 MM HG: CPT | Performed by: INTERNAL MEDICINE

## 2024-03-12 PROCEDURE — 3051F HG A1C>EQUAL 7.0%<8.0%: CPT | Performed by: STUDENT IN AN ORGANIZED HEALTH CARE EDUCATION/TRAINING PROGRAM

## 2024-03-12 PROCEDURE — 4010F ACE/ARB THERAPY RXD/TAKEN: CPT | Performed by: INTERNAL MEDICINE

## 2024-03-12 PROCEDURE — 83036 HEMOGLOBIN GLYCOSYLATED A1C: CPT

## 2024-03-12 PROCEDURE — 3078F DIAST BP <80 MM HG: CPT | Performed by: STUDENT IN AN ORGANIZED HEALTH CARE EDUCATION/TRAINING PROGRAM

## 2024-03-12 PROCEDURE — 3078F DIAST BP <80 MM HG: CPT | Performed by: INTERNAL MEDICINE

## 2024-03-12 PROCEDURE — 1036F TOBACCO NON-USER: CPT | Performed by: INTERNAL MEDICINE

## 2024-03-12 PROCEDURE — 4010F ACE/ARB THERAPY RXD/TAKEN: CPT | Performed by: STUDENT IN AN ORGANIZED HEALTH CARE EDUCATION/TRAINING PROGRAM

## 2024-03-12 PROCEDURE — 1036F TOBACCO NON-USER: CPT | Performed by: STUDENT IN AN ORGANIZED HEALTH CARE EDUCATION/TRAINING PROGRAM

## 2024-03-12 PROCEDURE — 99213 OFFICE O/P EST LOW 20 MIN: CPT | Performed by: INTERNAL MEDICINE

## 2024-03-12 PROCEDURE — 36415 COLL VENOUS BLD VENIPUNCTURE: CPT

## 2024-03-12 PROCEDURE — 85025 COMPLETE CBC W/AUTO DIFF WBC: CPT

## 2024-03-12 RX ORDER — LISINOPRIL 5 MG/1
5 TABLET ORAL
Qty: 90 TABLET | Refills: 3 | Status: SHIPPED | OUTPATIENT
Start: 2024-03-12 | End: 2024-05-24 | Stop reason: SDUPTHER

## 2024-03-12 RX ORDER — LANCETS
EACH MISCELLANEOUS
Qty: 180 EACH | Refills: 3 | Status: SHIPPED | OUTPATIENT
Start: 2024-03-12

## 2024-03-12 RX ORDER — BLOOD SUGAR DIAGNOSTIC
STRIP MISCELLANEOUS
Qty: 180 STRIP | Refills: 3 | Status: SHIPPED | OUTPATIENT
Start: 2024-03-12

## 2024-03-12 RX ORDER — PEN NEEDLE, DIABETIC 30 GX3/16"
NEEDLE, DISPOSABLE MISCELLANEOUS
Qty: 90 EACH | Refills: 3 | Status: SHIPPED | OUTPATIENT
Start: 2024-03-12

## 2024-03-12 RX ORDER — ATORVASTATIN CALCIUM 80 MG/1
80 TABLET, FILM COATED ORAL DAILY
Qty: 90 TABLET | Refills: 3 | Status: SHIPPED | OUTPATIENT
Start: 2024-03-12 | End: 2024-05-24 | Stop reason: SDUPTHER

## 2024-03-12 RX ORDER — METFORMIN HYDROCHLORIDE 1000 MG/1
1000 TABLET ORAL
Qty: 180 TABLET | Refills: 3 | Status: SHIPPED | OUTPATIENT
Start: 2024-03-12 | End: 2024-05-24 | Stop reason: SDUPTHER

## 2024-03-12 ASSESSMENT — ENCOUNTER SYMPTOMS
WEAKNESS: 0
EYE REDNESS: 0
RHINORRHEA: 0
FATIGUE: 0
ABDOMINAL PAIN: 0
ACTIVITY CHANGE: 0
LOSS OF SENSATION IN FEET: 0
NUMBNESS: 0
EYE ITCHING: 0
BRUISES/BLEEDS EASILY: 0
CONSTIPATION: 0
DIZZINESS: 0
SHORTNESS OF BREATH: 0
FACIAL ASYMMETRY: 0
FLANK PAIN: 0
RECTAL PAIN: 0
DIFFICULTY URINATING: 0
SINUS PRESSURE: 0
FACIAL SWELLING: 0
SPEECH DIFFICULTY: 0
ADENOPATHY: 0
HEADACHES: 0
COLOR CHANGE: 0
ANAL BLEEDING: 0
TROUBLE SWALLOWING: 0
SLEEP DISTURBANCE: 0
NAUSEA: 0
DEPRESSION: 0
PALPITATIONS: 0
SORE THROAT: 0
LIGHT-HEADEDNESS: 0
EYE DISCHARGE: 0
BLOOD IN STOOL: 0
TREMORS: 0
NECK STIFFNESS: 0
SINUS PAIN: 0
VOICE CHANGE: 0
DIARRHEA: 0
DYSURIA: 0
ARTHRALGIAS: 0
DIAPHORESIS: 0
COUGH: 0
PHOTOPHOBIA: 0
CHEST TIGHTNESS: 0
WHEEZING: 0
HEMATURIA: 0
OCCASIONAL FEELINGS OF UNSTEADINESS: 0
STRIDOR: 0
POLYDIPSIA: 0
FREQUENCY: 0
BACK PAIN: 0
SEIZURES: 0
POLYPHAGIA: 0
ABDOMINAL DISTENTION: 0
NECK PAIN: 0
APPETITE CHANGE: 0
CHOKING: 0
VOMITING: 0
JOINT SWELLING: 0
WOUND: 0
CHILLS: 0
EYE PAIN: 0
MYALGIAS: 0

## 2024-03-12 ASSESSMENT — PAIN SCALES - GENERAL: PAINLEVEL: 0-NO PAIN

## 2024-03-12 ASSESSMENT — PATIENT HEALTH QUESTIONNAIRE - PHQ9
2. FEELING DOWN, DEPRESSED OR HOPELESS: NOT AT ALL
1. LITTLE INTEREST OR PLEASURE IN DOING THINGS: NOT AT ALL
SUM OF ALL RESPONSES TO PHQ9 QUESTIONS 1 AND 2: 0

## 2024-03-12 NOTE — PATIENT INSTRUCTIONS
Please take medication as prescribed.  Follow-up in 3 months.  Diet and exercise.  Scheduled ophthalmology appointment.

## 2024-03-12 NOTE — PATIENT INSTRUCTIONS
Continue Lantus 24 units daily  If BG in am frequently less than 80 decrease to 22 units  Continue Metformin and Alopgliptin  Increase Jardiance to 25 mg daily  Continue Atorvastatin 80 mg daily  Continue Lisinopril   Ophthalmology referral    Blood work before next apt    RTC in August

## 2024-03-12 NOTE — PROGRESS NOTES
"Patient coming in for follow up for T2DM    Subjective   Addy Zimmerman \"Thomas\" is a 58 y.o. male who presents for follow up for Type 2 diabetes mellitus.     Lab Results   Component Value Date    HGBA1C 7.2 (H) 10/16/2023    Mr. Zimmerman is a 58 year old M with T2DM c/b retinopathy, HLD coming in for follow up  date of diagnosis: 15 years. Date of last HbA1c: Oct 2023 and results: 7.2%.   Had an A1c was 8.2% today morning.  Interval History:  A1c is 8% in april per patient so lantus was increased and BG improved   Saw podiatrist on terbinafine 3 months fungal infection improved signifcantly   Had issues with some medications and missed his meds for 1 month.  Was not checking his BG but was having frequent urination   Current DM Regimen:. Lantus 24 units   Alogliptin 25 mg  Jardiance 10 mg  Metformin 1000 mg BID.   Atorvastatin 80 mg  Lisinopril 5 mg  Glucose Ranges: In am: B48-13-56--242--128  Before dinner: [phone]  No low BG   Diet:  Breakfast: Whole grain cereal 1 bowl  Lunch: Cheese sandwich  Dinner: Chicken beans rice lentils  Snacks: Carrots or apple   Sometimes have some candy and cookies but in general stable  Diabetes Surveillance: Eye exam: 2024 Has retinopathy mild and has been following with ophthalmology twice yearly. Vision improved  Foot exam/podiatrist: Was Following with podiatry last 6 months ago. Numbness tingling improved  Cardiovascular: no coronary artery bypass graft and no coronary artery disease.   LDL: 63 on Atorvastatin 80  Renal: no nephropathy and no end stage renal disease. Jardiance and lisinopril  Neurologic: no neuropathy.     No abdominal pain  No nausea or vomiting  No constipation or diarrhea    Review of Systems  all pertinent systems reviewed and are otherwise negative   Objective   Visit Vitals  /69 (BP Location: Right arm, Patient Position: Sitting, BP Cuff Size: Adult)   Pulse 77   Temp 36.3 °C (97.3 °F) (Temporal)   Ht 1.753 m (5' 9\") "   Wt 85.3 kg (188 lb)   SpO2 97%   BMI 27.76 kg/m²   Smoking Status Never   BSA 2.04 m²      Physical Exam  Constitutional:       General: He is not in acute distress.     Appearance: Normal appearance.   Eyes:      Extraocular Movements: Extraocular movements intact.      Pupils: Pupils are equal, round, and reactive to light.   Cardiovascular:      Rate and Rhythm: Normal rate and regular rhythm.   Pulmonary:      Effort: Pulmonary effort is normal. No respiratory distress.      Breath sounds: Normal breath sounds.   Abdominal:      General: Bowel sounds are normal.      Palpations: Abdomen is soft.      Tenderness: There is no abdominal tenderness.   Skin:     Coloration: Skin is not jaundiced or pale.      Findings: No erythema or rash.   Neurological:      General: No focal deficit present.      Mental Status: He is alert and oriented to person, place, and time.      Deep Tendon Reflexes: Reflexes normal.   Psychiatric:         Mood and Affect: Mood normal.         Behavior: Behavior normal.         Lab Review  Glucose (mg/dL)   Date Value   10/16/2023 111 (H)   09/01/2022 120 (H)   02/18/2022 172 (H)   06/08/2021 160 (H)     POC HEMOGLOBIN A1c (%)   Date Value   07/13/2023 7.0 (A)   04/06/2023 8.0 (A)     Hemoglobin A1C (%)   Date Value   10/16/2023 7.2 (H)   09/01/2022 7.0 (A)   02/18/2022 8.9 (A)   06/08/2021 7.6     Bicarbonate (mmol/L)   Date Value   10/16/2023 27   09/01/2022 27   02/18/2022 27   06/08/2021 27     Urea Nitrogen (mg/dL)   Date Value   10/16/2023 20   09/01/2022 26 (H)   02/18/2022 17   06/08/2021 26 (H)     Creatinine (mg/dL)   Date Value   10/16/2023 0.82   09/01/2022 0.94   02/18/2022 0.88   06/08/2021 1.03     Lab Results   Component Value Date    CHOL 127 10/16/2023    CHOL 144 09/01/2022    CHOL 89 02/18/2022     Lab Results   Component Value Date    HDL 41.2 10/16/2023    HDL 47.6 09/01/2022    HDL 38.5 (A) 02/18/2022     Lab Results   Component Value Date    LDLCALC 63 10/16/2023  "    Lab Results   Component Value Date    TRIG 112 10/16/2023    TRIG 86 2022    TRIG 80 2022     No components found for: \"CHOLHDL\"   Lab Results   Component Value Date    TSH 1.58 10/16/2023       Assessment/Plan    Mr. Zimmerman is a 58 year old M with T2DM c/b retinopathy, HLD coming in for follow up  date of diagnosis: 15 years. Date of last HbA1c: Oct 2023 and results: 7.2%.   Had an A1c was 8.2% today morning.  Interval History:  A1c is 8% in april per patient so lantus was increased and BG improved   Saw podiatrist on terbinafine 3 months fungal infection improved signifcantly   Had issues with some medications and missed his meds for 1 month.  Was not checking his BG but was having frequent urination   Current DM Regimen:. Lantus 24 units   Alogliptin 25 mg  Jardiance 10 mg  Metformin 1000 mg BID.   Atorvastatin 80 mg  Lisinopril 5 mg  Glucose Ranges: In am: B80-00-49--417--128  Before dinner: [phone]  No low BG   Diabetes Surveillance: Eye exam: 2024 Has retinopathy mild and has been following with ophthalmology twice yearly. Vision improved  Foot exam/podiatrist: Was Following with podiatry last 6 months ago. Numbness tingling improved  Cardiovascular: no coronary artery bypass graft and no coronary artery disease.   LDL: 63 on Atorvastatin 80  Renal: no nephropathy and no end stage renal disease. Jardiance and lisinopril  Neurologic: no neuropathy.     Plan  Continue Lantus 24 units daily  If BG in am frequently less than 80 decrease to 22 units  Continue Metformin and Alopgliptin  Increase Jardiance to 25 mg daily  Continue Atorvastatin 80 mg daily  Continue Lisinopril   Ophthalmology referral    Blood work before next apt    RTC in August    Problem List Items Addressed This Visit       Diabetes mellitus (CMS/[facility])    Relevant Medications    empagliflozin (Jardiance) 25 mg    pen needle, diabetic (BD Ramila 2nd Gen Pen Needle) 32 gauge x \" needle    blood " sugar diagnostic (Accu-Chek Guide test strips) strip    lancets misc    Other Relevant Orders    Referral to Ophthalmology    Albumin , Urine Random    Hemoglobin A1C    Renal Function Panel    Lipid Panel    Hyperlipidemia - Primary    Relevant Orders    Lipid Panel

## 2024-03-12 NOTE — PROGRESS NOTES
Subjective   Patient ID: Thomas Zimmerman is a 58 y.o. male who presents for No chief complaint on file..    Patient presents for follow-up.  He has had difficulty getting his medication and was out of his medications for over a month.  He is now back on all of his medications.  He overall feels well.  He denies any headaches, no dizziness, no sinus problems, no chest pain or shortness of breath.  Denies abdominal pain no nausea vomiting or diarrhea.  He reports no new musculoskeletal complaints.         Review of Systems   Constitutional:  Negative for activity change, appetite change, chills, diaphoresis and fatigue.   HENT:  Negative for congestion, dental problem, drooling, ear discharge, ear pain, facial swelling, hearing loss, mouth sores, nosebleeds, postnasal drip, rhinorrhea, sinus pressure, sinus pain, sneezing, sore throat, tinnitus, trouble swallowing and voice change.    Eyes:  Negative for photophobia, pain, discharge, redness, itching and visual disturbance.   Respiratory:  Negative for cough, choking, chest tightness, shortness of breath, wheezing and stridor.    Cardiovascular:  Negative for chest pain, palpitations and leg swelling.   Gastrointestinal:  Negative for abdominal distention, abdominal pain, anal bleeding, blood in stool, constipation, diarrhea, nausea, rectal pain and vomiting.   Endocrine: Negative for cold intolerance, heat intolerance, polydipsia, polyphagia and polyuria.   Genitourinary:  Negative for decreased urine volume, difficulty urinating, dysuria, enuresis, flank pain, frequency, genital sores, hematuria and urgency.   Musculoskeletal:  Negative for arthralgias, back pain, gait problem, joint swelling, myalgias, neck pain and neck stiffness.   Skin:  Negative for color change, pallor, rash and wound.   Neurological:  Negative for dizziness, tremors, seizures, syncope, facial asymmetry, speech difficulty, weakness, light-headedness, numbness and headaches.   Hematological:   Negative for adenopathy. Does not bruise/bleed easily.   Psychiatric/Behavioral:  Negative for sleep disturbance.        Objective   /74   Pulse 84   Wt 82.6 kg (182 lb)   BMI 26.11 kg/m²     Physical Exam  Constitutional:       Appearance: Normal appearance.   Cardiovascular:      Rate and Rhythm: Normal rate and regular rhythm.      Heart sounds: No murmur heard.     No gallop.   Pulmonary:      Effort: No respiratory distress.      Breath sounds: No wheezing or rales.   Abdominal:      General: There is no distension.      Palpations: There is no mass.      Tenderness: There is no abdominal tenderness. There is no guarding.   Musculoskeletal:      Right lower leg: No edema.      Left lower leg: No edema.   Neurological:      Mental Status: He is alert.         Assessment/Plan   Diagnoses and all orders for this visit:  Mixed hyperlipidemia-we will continue with atorvastatin.  -     atorvastatin (Lipitor) 80 mg tablet; Take 1 tablet (80 mg) by mouth once daily.  Benign essential hypertension-stable on present medications  -     lisinopril 5 mg tablet; Take 1 tablet (5 mg) by mouth once daily.  Type 2 diabetes mellitus with other specified complication, unspecified whether long term insulin use (CMS/[facility])-hemoglobin A1c was 8.2.  He has been out of his medications.  He will follow-up with endocrinology today.  Will schedule an ophthalmology appointment  -     metFORMIN (Glucophage) 1,000 mg tablet; Take 1 tablet (1,000 mg) by mouth 2 times a day with meals. TAKE ONE  2 TIMES PER DAY WITH AM AND PM MEALS  Health care maintenance-Cologuard has been done.  Will get a COVID booster at the pharmacy  -     Basic Metabolic Panel; Future  -     CBC and Auto Differential; Future  -     Prostate Specific Antigen; Future  Elevated PSA velocity-will recheck PSA

## 2024-05-24 DIAGNOSIS — E78.2 MIXED HYPERLIPIDEMIA: ICD-10-CM

## 2024-05-24 DIAGNOSIS — I10 BENIGN ESSENTIAL HYPERTENSION: ICD-10-CM

## 2024-05-24 DIAGNOSIS — E11.69 TYPE 2 DIABETES MELLITUS WITH OTHER SPECIFIED COMPLICATION, UNSPECIFIED WHETHER LONG TERM INSULIN USE (MULTI): ICD-10-CM

## 2024-05-24 RX ORDER — LISINOPRIL 5 MG/1
5 TABLET ORAL
Qty: 90 TABLET | Refills: 3 | Status: SHIPPED | OUTPATIENT
Start: 2024-05-24 | End: 2024-05-30 | Stop reason: SDUPTHER

## 2024-05-24 RX ORDER — METFORMIN HYDROCHLORIDE 1000 MG/1
TABLET ORAL
Qty: 180 TABLET | Refills: 3 | Status: SHIPPED | OUTPATIENT
Start: 2024-05-24 | End: 2024-05-30 | Stop reason: SDUPTHER

## 2024-05-24 RX ORDER — ATORVASTATIN CALCIUM 80 MG/1
80 TABLET, FILM COATED ORAL DAILY
Qty: 90 TABLET | Refills: 3 | Status: SHIPPED | OUTPATIENT
Start: 2024-05-24 | End: 2024-05-30 | Stop reason: SDUPTHER

## 2024-05-30 DIAGNOSIS — I10 BENIGN ESSENTIAL HYPERTENSION: ICD-10-CM

## 2024-05-30 DIAGNOSIS — E78.2 MIXED HYPERLIPIDEMIA: ICD-10-CM

## 2024-05-30 DIAGNOSIS — E11.69 TYPE 2 DIABETES MELLITUS WITH OTHER SPECIFIED COMPLICATION, UNSPECIFIED WHETHER LONG TERM INSULIN USE (MULTI): ICD-10-CM

## 2024-05-30 RX ORDER — ATORVASTATIN CALCIUM 80 MG/1
80 TABLET, FILM COATED ORAL DAILY
Qty: 90 TABLET | Refills: 3 | Status: SHIPPED | OUTPATIENT
Start: 2024-05-30

## 2024-05-30 RX ORDER — CLOTRIMAZOLE AND BETAMETHASONE DIPROPIONATE 10; .64 MG/G; MG/G
CREAM TOPICAL
COMMUNITY
Start: 2021-10-18

## 2024-05-30 RX ORDER — BLOOD SUGAR DIAGNOSTIC
1 STRIP MISCELLANEOUS DAILY
COMMUNITY
Start: 2023-05-23

## 2024-05-30 RX ORDER — EZETIMIBE 10 MG/1
TABLET ORAL
COMMUNITY
Start: 2021-03-19

## 2024-05-30 RX ORDER — TERBINAFINE HYDROCHLORIDE 250 MG/1
250 TABLET ORAL DAILY
COMMUNITY
Start: 2023-10-31

## 2024-05-30 RX ORDER — METFORMIN HYDROCHLORIDE 1000 MG/1
TABLET ORAL
Qty: 180 TABLET | Refills: 3 | Status: SHIPPED | OUTPATIENT
Start: 2024-05-30

## 2024-05-30 RX ORDER — LISINOPRIL 5 MG/1
5 TABLET ORAL
Qty: 90 TABLET | Refills: 3 | Status: SHIPPED | OUTPATIENT
Start: 2024-05-30

## 2024-06-12 ENCOUNTER — APPOINTMENT (OUTPATIENT)
Dept: PRIMARY CARE | Facility: CLINIC | Age: 59
End: 2024-06-12
Payer: COMMERCIAL

## 2024-06-12 VITALS
BODY MASS INDEX: 28.06 KG/M2 | DIASTOLIC BLOOD PRESSURE: 76 MMHG | SYSTOLIC BLOOD PRESSURE: 126 MMHG | HEART RATE: 72 BPM | WEIGHT: 190 LBS

## 2024-06-12 DIAGNOSIS — E78.2 MIXED HYPERLIPIDEMIA: ICD-10-CM

## 2024-06-12 DIAGNOSIS — Z00.00 HEALTH CARE MAINTENANCE: ICD-10-CM

## 2024-06-12 DIAGNOSIS — I10 BENIGN ESSENTIAL HYPERTENSION: ICD-10-CM

## 2024-06-12 DIAGNOSIS — E11.69 TYPE 2 DIABETES MELLITUS WITH OTHER SPECIFIED COMPLICATION, UNSPECIFIED WHETHER LONG TERM INSULIN USE (MULTI): Primary | ICD-10-CM

## 2024-06-12 LAB — POC HEMOGLOBIN A1C: 8 % (ref 4.2–6.5)

## 2024-06-12 PROCEDURE — 3078F DIAST BP <80 MM HG: CPT | Performed by: INTERNAL MEDICINE

## 2024-06-12 PROCEDURE — 1036F TOBACCO NON-USER: CPT | Performed by: INTERNAL MEDICINE

## 2024-06-12 PROCEDURE — 4010F ACE/ARB THERAPY RXD/TAKEN: CPT | Performed by: INTERNAL MEDICINE

## 2024-06-12 PROCEDURE — 3074F SYST BP LT 130 MM HG: CPT | Performed by: INTERNAL MEDICINE

## 2024-06-12 PROCEDURE — 99214 OFFICE O/P EST MOD 30 MIN: CPT | Performed by: INTERNAL MEDICINE

## 2024-06-12 PROCEDURE — 3051F HG A1C>EQUAL 7.0%<8.0%: CPT | Performed by: INTERNAL MEDICINE

## 2024-06-12 PROCEDURE — 83036 HEMOGLOBIN GLYCOSYLATED A1C: CPT | Performed by: INTERNAL MEDICINE

## 2024-06-12 RX ORDER — INSULIN GLARGINE 100 [IU]/ML
30 INJECTION, SOLUTION SUBCUTANEOUS NIGHTLY
Qty: 3 ML | Refills: 11 | Status: SHIPPED | OUTPATIENT
Start: 2024-06-12

## 2024-06-12 ASSESSMENT — ENCOUNTER SYMPTOMS
NAUSEA: 0
COLOR CHANGE: 0
JOINT SWELLING: 0
PHOTOPHOBIA: 0
EYE DISCHARGE: 0
POLYDIPSIA: 0
NUMBNESS: 0
ABDOMINAL DISTENTION: 0
FLANK PAIN: 0
NECK STIFFNESS: 0
APPETITE CHANGE: 0
HEADACHES: 0
HEMATURIA: 0
POLYPHAGIA: 0
SLEEP DISTURBANCE: 0
STRIDOR: 0
CONSTIPATION: 0
WEAKNESS: 0
FACIAL SWELLING: 0
ANAL BLEEDING: 0
DIFFICULTY URINATING: 0
ARTHRALGIAS: 0
WOUND: 0
WHEEZING: 0
RHINORRHEA: 0
COUGH: 0
FREQUENCY: 0
EYE PAIN: 0
BACK PAIN: 0
CHOKING: 0
DYSURIA: 0
LIGHT-HEADEDNESS: 0
SINUS PAIN: 0
BRUISES/BLEEDS EASILY: 0
TREMORS: 0
SHORTNESS OF BREATH: 0
DIZZINESS: 0
SINUS PRESSURE: 0
CHEST TIGHTNESS: 0
MYALGIAS: 0
SEIZURES: 0
DIARRHEA: 0
PALPITATIONS: 0
ADENOPATHY: 0
ACTIVITY CHANGE: 0
VOMITING: 0
BLOOD IN STOOL: 0
NECK PAIN: 0
VOICE CHANGE: 0
SPEECH DIFFICULTY: 0
CHILLS: 0
EYE ITCHING: 0
FACIAL ASYMMETRY: 0
SORE THROAT: 0
ABDOMINAL PAIN: 0
RECTAL PAIN: 0
FATIGUE: 0
EYE REDNESS: 0
TROUBLE SWALLOWING: 0
DIAPHORESIS: 0

## 2024-06-12 NOTE — PROGRESS NOTES
Subjective   Patient ID: Thomas Zimmerman is a 58 y.o. male who presents for Follow-up.    Patient presents for follow-up.  He has been compliant with his medications, diet and exercise.  He reports his glucoses were mildly elevated.  He overall feels well.  He denies any headaches, no dizziness, no chest pain or shortness of breath.  Denies abdominal pain no nausea vomiting or diarrhea.  Reports no new musculoskeletal complaints.         Review of Systems   Constitutional:  Negative for activity change, appetite change, chills, diaphoresis and fatigue.   HENT:  Negative for congestion, dental problem, drooling, ear discharge, ear pain, facial swelling, hearing loss, mouth sores, nosebleeds, postnasal drip, rhinorrhea, sinus pressure, sinus pain, sneezing, sore throat, tinnitus, trouble swallowing and voice change.    Eyes:  Negative for photophobia, pain, discharge, redness, itching and visual disturbance.   Respiratory:  Negative for cough, choking, chest tightness, shortness of breath, wheezing and stridor.    Cardiovascular:  Negative for chest pain, palpitations and leg swelling.   Gastrointestinal:  Negative for abdominal distention, abdominal pain, anal bleeding, blood in stool, constipation, diarrhea, nausea, rectal pain and vomiting.   Endocrine: Negative for cold intolerance, heat intolerance, polydipsia, polyphagia and polyuria.   Genitourinary:  Negative for decreased urine volume, difficulty urinating, dysuria, enuresis, flank pain, frequency, genital sores, hematuria and urgency.   Musculoskeletal:  Negative for arthralgias, back pain, gait problem, joint swelling, myalgias, neck pain and neck stiffness.   Skin:  Negative for color change, pallor, rash and wound.   Neurological:  Negative for dizziness, tremors, seizures, syncope, facial asymmetry, speech difficulty, weakness, light-headedness, numbness and headaches.   Hematological:  Negative for adenopathy. Does not bruise/bleed easily.    Psychiatric/Behavioral:  Negative for sleep disturbance.        Objective   /76   Pulse 72   Wt 86.2 kg (190 lb)   BMI 28.06 kg/m²     Physical Exam  Constitutional:       Appearance: Normal appearance.   Cardiovascular:      Rate and Rhythm: Normal rate and regular rhythm.      Heart sounds: No murmur heard.     No gallop.   Pulmonary:      Effort: No respiratory distress.      Breath sounds: No wheezing or rales.   Abdominal:      General: There is no distension.      Palpations: There is no mass.      Tenderness: There is no abdominal tenderness. There is no guarding.   Musculoskeletal:      Right lower leg: No edema.      Left lower leg: No edema.   Neurological:      Mental Status: He is alert.         Assessment/Plan   Diagnoses and all orders for this visit:  Type 2 diabetes mellitus with other specified complication, unspecified whether long term insulin use (Multi)-hemoglobin A1c was six 8.0.  Will increase insulin to 30 units.  He will consider taking Ozempic.  Will schedule an ophthalmology appointment.  Diet and exercise.  -     POCT glycosylated hemoglobin (Hb A1C) manually resulted  -     insulin glargine (Lantus Solostar U-100 Insulin) 100 unit/mL (3 mL) pen; Inject 30 Units under the skin once daily at bedtime. UP TO 30 UNITS PER DIRECTED  Health care maintenance-Cologuard has been done.  Immunizations are up-to-date.  -     PSA; Future  Mixed hyperlipidemia-we will continue with statin  Benign essential hypertension-stable on present medication.  Elevated PSA velocity-recheck PSA

## 2024-07-09 ENCOUNTER — PHARMACY VISIT (OUTPATIENT)
Dept: PHARMACY | Facility: CLINIC | Age: 59
End: 2024-07-09
Payer: COMMERCIAL

## 2024-07-09 PROCEDURE — RXMED WILLOW AMBULATORY MEDICATION CHARGE

## 2024-08-12 ENCOUNTER — APPOINTMENT (OUTPATIENT)
Dept: ENDOCRINOLOGY | Facility: HOSPITAL | Age: 59
End: 2024-08-12
Payer: COMMERCIAL

## 2024-08-30 ENCOUNTER — OFFICE VISIT (OUTPATIENT)
Dept: OPHTHALMOLOGY | Facility: CLINIC | Age: 59
End: 2024-08-30
Payer: COMMERCIAL

## 2024-08-30 DIAGNOSIS — H52.7 REFRACTIVE ERROR: ICD-10-CM

## 2024-08-30 DIAGNOSIS — E11.9 TYPE 2 DIABETES MELLITUS WITHOUT COMPLICATION, UNSPECIFIED WHETHER LONG TERM INSULIN USE (MULTI): Primary | ICD-10-CM

## 2024-08-30 DIAGNOSIS — H25.813 COMBINED FORMS OF AGE-RELATED CATARACT OF BOTH EYES: ICD-10-CM

## 2024-08-30 PROBLEM — H35.049 RETINAL MICROANEURYSM: Status: ACTIVE | Noted: 2024-08-30

## 2024-08-30 PROCEDURE — 99204 OFFICE O/P NEW MOD 45 MIN: CPT | Performed by: OPHTHALMOLOGY

## 2024-08-30 PROCEDURE — 92015 DETERMINE REFRACTIVE STATE: CPT | Performed by: OPHTHALMOLOGY

## 2024-08-30 PROCEDURE — 99214 OFFICE O/P EST MOD 30 MIN: CPT | Performed by: OPHTHALMOLOGY

## 2024-08-30 ASSESSMENT — KERATOMETRY
OS_AXISANGLE2_DEGREES: 180
OD_AXISANGLE2_DEGREES: 90
OD_AXISANGLE_DEGREES: 180
OS_K2POWER_DIOPTERS: 44.0
OS_AXISANGLE_DEGREES: 90
OD_K1POWER_DIOPTERS: 43.25
OD_K2POWER_DIOPTERS: 44.00
OS_K1POWER_DIOPTERS: 44.00

## 2024-08-30 ASSESSMENT — SLIT LAMP EXAM - LIDS
COMMENTS: 1+ DERMATOCHALASIS - UPPER LID, 1+ BLEPHARITIS
COMMENTS: 1+ DERMATOCHALASIS - UPPER LID, 1+ BLEPHARITIS

## 2024-08-30 ASSESSMENT — TONOMETRY
IOP_METHOD: GOLDMANN APPLANATION
OD_IOP_MMHG: 19
OS_IOP_MMHG: 19

## 2024-08-30 ASSESSMENT — EXTERNAL EXAM - LEFT EYE: OS_EXAM: NORMAL

## 2024-08-30 ASSESSMENT — ENCOUNTER SYMPTOMS
CARDIOVASCULAR NEGATIVE: 0
ALLERGIC/IMMUNOLOGIC NEGATIVE: 0
NEUROLOGICAL NEGATIVE: 0
MUSCULOSKELETAL NEGATIVE: 0
RESPIRATORY NEGATIVE: 0
PSYCHIATRIC NEGATIVE: 0
CONSTITUTIONAL NEGATIVE: 0
ENDOCRINE NEGATIVE: 0
GASTROINTESTINAL NEGATIVE: 0
EYES NEGATIVE: 0
HEMATOLOGIC/LYMPHATIC NEGATIVE: 0

## 2024-08-30 ASSESSMENT — VISUAL ACUITY
OD_CC: 20/20
METHOD: SNELLEN - LINEAR
OS_CC+: -2
OS_CC: 20/20

## 2024-08-30 ASSESSMENT — REFRACTION_WEARINGRX
OD_SPHERE: +0.00
OD_ADD: +1.75
OS_CYLINDER: -0.50
OD_CYLINDER: -1.00
OS_AXIS: 180
OD_AXIS: 103
OS_ADD: +1.75
OS_SPHERE: -0.75

## 2024-08-30 ASSESSMENT — CUP TO DISC RATIO
OD_RATIO: 0.3
OS_RATIO: 0.3

## 2024-08-30 ASSESSMENT — REFRACTION_MANIFEST
OD_CYLINDER: -2.25
OS_SPHERE: +0.25
OD_SPHERE: +0.75
OS_AXIS: 085
OD_AXIS: 095
OS_CYLINDER: -1.25

## 2024-08-30 ASSESSMENT — EXTERNAL EXAM - RIGHT EYE: OD_EXAM: NORMAL

## 2024-08-30 ASSESSMENT — PATIENT HEALTH QUESTIONNAIRE - PHQ9
SUM OF ALL RESPONSES TO PHQ9 QUESTIONS 1 AND 2: 0
1. LITTLE INTEREST OR PLEASURE IN DOING THINGS: NOT AT ALL
2. FEELING DOWN, DEPRESSED OR HOPELESS: NOT AT ALL

## 2024-08-30 ASSESSMENT — PAIN SCALES - GENERAL: PAINLEVEL: 0-NO PAIN

## 2024-08-30 NOTE — PROGRESS NOTES
"Subjective   Patient ID: Thomas Zimmerman is a 59 y.o. male.    Chief Complaint    New Patient Visit       HPI    PREVIOUS OPHTH RECORDS WITH CHART    Complete and diabetic dilated exam.  No recent changes in health history or meds.  Vision is good and stable.  No new problems or complaints.        Last edited by Mario Watson MD on 8/30/2024 11:39 AM.        No current outpatient medications on file. (Ophthalmology pharm classes)       Current Outpatient Medications (Other)   Medication Sig Dispense Refill    aspirin 81 mg capsule Take by mouth. TAKE PER DIRECTED      aspirin-calcium carbonate 81 mg-300 mg calcium(777 mg) tablet Take by mouth.      atorvastatin (Lipitor) 80 mg tablet Take 1 tablet (80 mg) by mouth once daily. 90 tablet 3    blood sugar diagnostic (Accu-Chek Guide test strips) strip Check BG 2-3 times a day 180 strip 3    blood sugar diagnostic (Accu-Chek Guide test strips) strip 1 strip by subdermal route once daily.      ciclopirox (Penlac) 8 % solution Apply to affected toenails once daily.      clotrimazole-betamethasone (Lotrisone) cream Apply to bottom of feet once daily.      empagliflozin (Jardiance) 25 mg Take 1 tablet (25 mg) by mouth once daily. 90 tablet 3    insulin glargine (Lantus Solostar U-100 Insulin) 100 unit/mL (3 mL) pen Inject 30 Units under the skin once daily at bedtime. UP TO 30 UNITS PER DIRECTED 3 mL 11    lancets misc Check BG 2-3 times 180 each 3    lisinopril 5 mg tablet Take 1 tablet (5 mg) by mouth once daily. 90 tablet 3    metFORMIN (Glucophage) 1,000 mg tablet TAKE 1 TABLET (1,000 MG) BY MOUTH ONCE DAILY WITH A MEAL. TAKE TWO TIMES PER DAY WITH AM AND PM MEALS 90 tablet 2    metFORMIN (Glucophage) 1,000 mg tablet TAKE ONE TABLET WITH AM AND PM MEALS 180 tablet 3    pen needle, diabetic (BD Ramila 2nd Gen Pen Needle) 32 gauge x 5/32\" needle Use daily with lantus 90 each 3    sildenafil (Viagra) 100 mg tablet Take 1 tablet (100 mg) by mouth. 1/2 TABLET 1 HR BEFORE " NEEDED      sildenafil (Viagra) 100 mg tablet TAKE ONE-HALF (1/2) TABLET BY MOUTH DAILY ONE HOUR BEFORE NEEDED 10 tablet 14    SITagliptin phosphate (Januvia) 100 mg tablet Take 1 tablet (100 mg) by mouth once daily. 90 tablet 3    terbinafine (LamISIL) 250 mg tablet Take 1 tablet (250 mg) by mouth once daily.      ezetimibe (Zetia) 10 mg tablet Take by mouth.         Objective   Base Eye Exam       Visual Acuity (Snellen - Linear)         Right Left    Dist cc 20/20 20/20 -2              Tonometry (Goldmann Applanation, 10:49 AM)         Right Left    Pressure 19 19              Pupils         Dark Shape React APD    Right 4 Round 1 None    Left 4 Round 1 None              Extraocular Movement         Right Left     Full Full              Dilation       Both eyes: 1% Tropic 2.5% Phen @ 10:49 AM                  Additional Tests       Keratometry         K1 Axis K2 Axis    Right 43.25 90 44.00 180    Left 44.00 180 44.0 90                  Slit Lamp and Fundus Exam       External Exam         Right Left    External Normal Normal              Slit Lamp Exam         Right Left    Lids/Lashes 1+ Dermatochalasis - upper lid, 1+ Blepharitis 1+ Dermatochalasis - upper lid, 1+ Blepharitis    Conjunctiva/Sclera White and quiet White and quiet    Cornea Clear Clear    Anterior Chamber Deep and quiet Deep and quiet    Iris Round and reactive Round and reactive    Lens Trace Nuclear sclerosis, Trace Cortical cataract Trace Nuclear sclerosis, Trace Cortical cataract    Anterior Vitreous Vitreous syneresis Vitreous syneresis              Fundus Exam         Right Left    Disc Normal Normal    C/D Ratio 0.3 0.3    Macula no BDR maybe one MA    Vessels Normal Normal    Periphery Normal Normal                  Refraction       Wearing Rx         Sphere Cylinder Axis Add    Right +0.00 -1.00 103 +1.75    Left -0.75 -0.50 180 +1.75              Manifest Refraction         Sphere Cylinder Axis    Right +0.75 -2.25 095    Left +0.25  -1.25 085              Final Rx         Sphere Cylinder [place] Dist VA Add Near VA    Right +0.50 -1.25 105 20/20 +2.25 20/20    Left -0.25 -0.50 005 20/20 +2.25 20/20      Expiration Date: 8/30/2026    Pupillary Distance: 65                    Assessment/Plan   Problem List Items Addressed This Visit          Eye/Vision problems    Diabetes mellitus (Multi) - Primary     New rx given.  F/u one year full with oct mac.           Combined forms of age-related cataract of both eyes    Refractive error

## 2024-08-30 NOTE — LETTER
"August 30, 2024    Abhijeet Simeon MD  [address] [facility] [address]     Patient: Thomas Zimmerman   YOB: 1965   Date of Visit: 8/30/2024       Dear Dr. Abhijeet Simeon MD:    Thank you for referring Thomas Zimmerman to me for evaluation. Here is my assessment and plan of care:    Assessment/Plan:  Addy \"Homer" was seen today for new patient visit.  Diagnoses and all orders for this visit:  Type 2 diabetes mellitus without complication, unspecified whether long term insulin use (Multi) (Primary)  Combined forms of age-related cataract of both eyes  Refractive error    Below you will find my full exam findings. If you have questions, please do not hesitate to call me. I look forward to following Thomas along with you.     No signs of background diabetic retinopathy (BDR) OU.  F/u one year full.      Sincerely,        Mario Watson MD        CC:   Senthil Mora MD        Base Eye Exam       Visual Acuity (Snellen - Linear)         Right Left    Dist cc 20/20 20/20 -2              Tonometry (Goldmann Applanation, 10:49 AM)         Right Left    Pressure 19 19              Pupils         Dark Shape React APD    Right 4 Round 1 None    Left 4 Round 1 None              Extraocular Movement         Right Left     Full Full              Dilation       Both eyes: 1% Tropic 2.5% Phen @ 10:49 AM                  Additional Tests       Keratometry         K1 Axis K2 Axis    Right 43.25 90 44.00 180    Left 44.00 180 44.0 90                  Slit Lamp and Fundus Exam       External Exam         Right Left    External Normal Normal              Slit Lamp Exam         Right Left    Lids/Lashes 1+ Dermatochalasis - upper lid, 1+ Blepharitis 1+ Dermatochalasis - upper lid, 1+ Blepharitis    Conjunctiva/Sclera White and quiet White and quiet    Cornea Clear Clear    Anterior Chamber Deep and quiet Deep and quiet    Iris Round and reactive Round and reactive    Lens Trace Nuclear sclerosis, Trace Cortical cataract " Trace Nuclear sclerosis, Trace Cortical cataract    Anterior Vitreous Vitreous syneresis Vitreous syneresis              Fundus Exam         Right Left    Disc Normal Normal    C/D Ratio 0.3 0.3    Macula no BDR maybe one MA    Vessels Normal Normal    Periphery Normal Normal                  Refraction       Wearing Rx         Sphere Cylinder Axis Add    Right +0.00 -1.00 103 +1.75    Left -0.75 -0.50 180 +1.75              Manifest Refraction         Sphere Cylinder Axis    Right +0.75 -2.25 095    Left +0.25 -1.25 085              Final Rx         Sphere Cylinder [place] Dist VA Add Near VA    Right +0.50 -1.25 105 20/20 +2.25 20/20    Left -0.25 -0.50 005 20/20 +2.25 20/20      Expiration Date: 8/30/2026    Pupillary Distance: 65

## 2024-09-05 ENCOUNTER — LAB (OUTPATIENT)
Dept: LAB | Facility: LAB | Age: 59
End: 2024-09-05
Payer: COMMERCIAL

## 2024-09-05 DIAGNOSIS — Z00.00 HEALTH CARE MAINTENANCE: ICD-10-CM

## 2024-09-05 DIAGNOSIS — E78.2 MIXED HYPERLIPIDEMIA: ICD-10-CM

## 2024-09-05 DIAGNOSIS — E11.9 TYPE 2 DIABETES MELLITUS WITHOUT COMPLICATION, WITHOUT LONG-TERM CURRENT USE OF INSULIN (MULTI): ICD-10-CM

## 2024-09-05 LAB
ALBUMIN SERPL BCP-MCNC: 4.2 G/DL (ref 3.4–5)
ANION GAP SERPL CALC-SCNC: 12 MMOL/L (ref 10–20)
BASOPHILS # BLD AUTO: 0.06 X10*3/UL (ref 0–0.1)
BASOPHILS NFR BLD AUTO: 0.7 %
BUN SERPL-MCNC: 16 MG/DL (ref 6–23)
CALCIUM SERPL-MCNC: 9.4 MG/DL (ref 8.6–10.6)
CHLORIDE SERPL-SCNC: 103 MMOL/L (ref 98–107)
CHOLEST SERPL-MCNC: 112 MG/DL (ref 0–199)
CHOLESTEROL/HDL RATIO: 2.7
CO2 SERPL-SCNC: 30 MMOL/L (ref 21–32)
CREAT SERPL-MCNC: 0.89 MG/DL (ref 0.5–1.3)
CREAT UR-MCNC: 110.7 MG/DL (ref 20–370)
EGFRCR SERPLBLD CKD-EPI 2021: >90 ML/MIN/1.73M*2
EOSINOPHIL # BLD AUTO: 0.15 X10*3/UL (ref 0–0.7)
EOSINOPHIL NFR BLD AUTO: 1.7 %
ERYTHROCYTE [DISTWIDTH] IN BLOOD BY AUTOMATED COUNT: 12.3 % (ref 11.5–14.5)
EST. AVERAGE GLUCOSE BLD GHB EST-MCNC: 160 MG/DL
GLUCOSE SERPL-MCNC: 103 MG/DL (ref 74–99)
HBA1C MFR BLD: 7.2 %
HCT VFR BLD AUTO: 50.3 % (ref 41–52)
HDLC SERPL-MCNC: 41.4 MG/DL
HGB BLD-MCNC: 16.2 G/DL (ref 13.5–17.5)
IMM GRANULOCYTES # BLD AUTO: 0.04 X10*3/UL (ref 0–0.7)
IMM GRANULOCYTES NFR BLD AUTO: 0.5 % (ref 0–0.9)
LDLC SERPL CALC-MCNC: 51 MG/DL
LYMPHOCYTES # BLD AUTO: 1.6 X10*3/UL (ref 1.2–4.8)
LYMPHOCYTES NFR BLD AUTO: 18.4 %
MCH RBC QN AUTO: 29.4 PG (ref 26–34)
MCHC RBC AUTO-ENTMCNC: 32.2 G/DL (ref 32–36)
MCV RBC AUTO: 91 FL (ref 80–100)
MICROALBUMIN UR-MCNC: <7 MG/L
MICROALBUMIN/CREAT UR: NORMAL MG/G{CREAT}
MONOCYTES # BLD AUTO: 0.93 X10*3/UL (ref 0.1–1)
MONOCYTES NFR BLD AUTO: 10.7 %
NEUTROPHILS # BLD AUTO: 5.91 X10*3/UL (ref 1.2–7.7)
NEUTROPHILS NFR BLD AUTO: 68 %
NON HDL CHOLESTEROL: 71 MG/DL (ref 0–149)
NRBC BLD-RTO: 0 /100 WBCS (ref 0–0)
PHOSPHATE SERPL-MCNC: 3.4 MG/DL (ref 2.5–4.9)
PLATELET # BLD AUTO: 223 X10*3/UL (ref 150–450)
POTASSIUM SERPL-SCNC: 4.8 MMOL/L (ref 3.5–5.3)
PSA SERPL-MCNC: 1.43 NG/ML
RBC # BLD AUTO: 5.51 X10*6/UL (ref 4.5–5.9)
SODIUM SERPL-SCNC: 140 MMOL/L (ref 136–145)
TRIGL SERPL-MCNC: 100 MG/DL (ref 0–149)
VLDL: 20 MG/DL (ref 0–40)
WBC # BLD AUTO: 8.7 X10*3/UL (ref 4.4–11.3)

## 2024-09-05 PROCEDURE — 82043 UR ALBUMIN QUANTITATIVE: CPT

## 2024-09-05 PROCEDURE — 83036 HEMOGLOBIN GLYCOSYLATED A1C: CPT

## 2024-09-05 PROCEDURE — 80069 RENAL FUNCTION PANEL: CPT

## 2024-09-05 PROCEDURE — 82570 ASSAY OF URINE CREATININE: CPT

## 2024-09-05 PROCEDURE — 36415 COLL VENOUS BLD VENIPUNCTURE: CPT

## 2024-09-05 PROCEDURE — 84153 ASSAY OF PSA TOTAL: CPT

## 2024-09-05 PROCEDURE — 85025 COMPLETE CBC W/AUTO DIFF WBC: CPT

## 2024-09-05 PROCEDURE — 80061 LIPID PANEL: CPT

## 2024-09-11 ENCOUNTER — APPOINTMENT (OUTPATIENT)
Dept: ENDOCRINOLOGY | Facility: CLINIC | Age: 59
End: 2024-09-11
Payer: COMMERCIAL

## 2024-09-11 VITALS
BODY MASS INDEX: 27.92 KG/M2 | HEIGHT: 70 IN | SYSTOLIC BLOOD PRESSURE: 117 MMHG | HEART RATE: 77 BPM | WEIGHT: 195 LBS | DIASTOLIC BLOOD PRESSURE: 63 MMHG

## 2024-09-11 DIAGNOSIS — E11.9 TYPE 2 DIABETES MELLITUS WITHOUT COMPLICATION, WITHOUT LONG-TERM CURRENT USE OF INSULIN (MULTI): Primary | ICD-10-CM

## 2024-09-11 DIAGNOSIS — E78.2 MIXED HYPERLIPIDEMIA: ICD-10-CM

## 2024-09-11 PROCEDURE — 4010F ACE/ARB THERAPY RXD/TAKEN: CPT | Performed by: STUDENT IN AN ORGANIZED HEALTH CARE EDUCATION/TRAINING PROGRAM

## 2024-09-11 PROCEDURE — 3048F LDL-C <100 MG/DL: CPT | Performed by: STUDENT IN AN ORGANIZED HEALTH CARE EDUCATION/TRAINING PROGRAM

## 2024-09-11 PROCEDURE — 3008F BODY MASS INDEX DOCD: CPT | Performed by: STUDENT IN AN ORGANIZED HEALTH CARE EDUCATION/TRAINING PROGRAM

## 2024-09-11 PROCEDURE — 3051F HG A1C>EQUAL 7.0%<8.0%: CPT | Performed by: STUDENT IN AN ORGANIZED HEALTH CARE EDUCATION/TRAINING PROGRAM

## 2024-09-11 PROCEDURE — 3074F SYST BP LT 130 MM HG: CPT | Performed by: STUDENT IN AN ORGANIZED HEALTH CARE EDUCATION/TRAINING PROGRAM

## 2024-09-11 PROCEDURE — 3062F POS MACROALBUMINURIA REV: CPT | Performed by: STUDENT IN AN ORGANIZED HEALTH CARE EDUCATION/TRAINING PROGRAM

## 2024-09-11 PROCEDURE — 3078F DIAST BP <80 MM HG: CPT | Performed by: STUDENT IN AN ORGANIZED HEALTH CARE EDUCATION/TRAINING PROGRAM

## 2024-09-11 PROCEDURE — 99213 OFFICE O/P EST LOW 20 MIN: CPT | Performed by: STUDENT IN AN ORGANIZED HEALTH CARE EDUCATION/TRAINING PROGRAM

## 2024-09-11 RX ORDER — AA/PROT/LYSINE/METHIO/VIT C/B6 50-12.5 MG
10 TABLET ORAL DAILY
COMMUNITY

## 2024-09-11 RX ORDER — CHOLECALCIFEROL (VITAMIN D3) 25 MCG
1000 TABLET ORAL DAILY
COMMUNITY

## 2024-09-11 NOTE — PATIENT INSTRUCTIONS
Continue Lantus 28 units daily  If BG in am frequently less than 80 decrease to 24 units  Continue Metformin and Sitagliptin   Continue Jardiance 25 mg daily  Continue Atorvastatin 80 mg daily  Continue Lisinopril   Follow up Ophthalmology     RTC in 6 months

## 2024-09-11 NOTE — PROGRESS NOTES
"Patient coming in for follow up for T2DM    Subjective   Addy Zimmerman \"Thomas\" is a 59 y.o. male who presents for follow up for Type 2 diabetes mellitus.   Lab Results   Component Value Date    HGBA1C 7.2 (H) 2024      Mr. Zimmerman is a 59year old M with T2DM c/b retinopathy, HLD coming in for follow up  date of diagnosis: 15 years. Date of last HbA1c: Oct 2023 and results: 7.2%.   Had an A1c was 8.2% today morning.  Interval History:  A1c is 8% in april per patient so lantus was increased and BG improved   Saw podiatrist on terbinafine 3 months fungal infection improved signifcantly   Had issues with some medications and missed his meds for 1 month.  Was not checking his BG but was having frequent urination   Current DM Regimen:. Lantus 28 units   Alogliptin 25 mg switched to Januvia 100 mg daily (3-4 months)  Jardiance 25 mg  Metformin 1000 mg BID.   Atorvastatin 80 mg  Lisinopril 5 mg  Glucose Ranges: In am: [id number]---110-125  Before dinner: 115-120  No low BG   Diabetes Surveillance: Eye exam: 2024 Has retinopathy mild and has been following with ophthalmology twice yearly. Vision improved  Foot exam/podiatrist: Was Following with podiatry last a year ago. Numbness tingling improved  Cardiovascular: no coronary artery bypass graft and no coronary artery disease.   LDL: 51 on Atorvastatin 80  Renal: no nephropathy and no end stage renal disease. Jardiance and lisinopril  Neurologic: no neuropathy.    Eats 3 meals a day biggest dinner.   Snacks: Occasional  Once in a while chinese food  Gained 7 lbs since last visit    Review of Systems  all pertinent systems reviewed and are otherwise negative   Objective   /63   Pulse 77   Ht 1.778 m (5' 10\")   Wt 88.5 kg (195 lb)   BMI 27.98 kg/m²   Physical Exam  Constitutional:       General: He is not in acute distress.     Appearance: Normal appearance.   Eyes:      Extraocular Movements: Extraocular movements intact.    " "  Pupils: Pupils are equal, round, and reactive to light.   Cardiovascular:      Rate and Rhythm: Normal rate and regular rhythm.   Pulmonary:      Effort: Pulmonary effort is normal. No respiratory distress.      Breath sounds: Normal breath sounds.   Abdominal:      General: Bowel sounds are normal.      Palpations: Abdomen is soft.      Tenderness: There is no abdominal tenderness.   Skin:     Coloration: Skin is not jaundiced or pale.      Findings: No erythema or rash.   Neurological:      General: No focal deficit present.      Mental Status: He is alert and oriented to person, place, and time.      Deep Tendon Reflexes: Reflexes normal.   Psychiatric:         Mood and Affect: Mood normal.         Behavior: Behavior normal.         Lab Review  Glucose (mg/dL)   Date Value   09/05/2024 103 (H)   03/12/2024 122 (H)   10/16/2023 111 (H)     POC HEMOGLOBIN A1c (%)   Date Value   06/12/2024 8.0 (A)   07/13/2023 7.0 (A)   04/06/2023 8.0 (A)     Hemoglobin A1C (%)   Date Value   09/05/2024 7.2 (H)   03/12/2024 7.8 (H)   10/16/2023 7.2 (H)     Bicarbonate (mmol/L)   Date Value   09/05/2024 30   03/12/2024 30   10/16/2023 27     Urea Nitrogen (mg/dL)   Date Value   09/05/2024 16   03/12/2024 18   10/16/2023 20     Creatinine (mg/dL)   Date Value   09/05/2024 0.89   03/12/2024 0.89   10/16/2023 0.82     Lab Results   Component Value Date    CHOL 112 09/05/2024    CHOL 127 10/16/2023    CHOL 144 09/01/2022     Lab Results   Component Value Date    HDL 41.4 09/05/2024    HDL 41.2 10/16/2023    HDL 47.6 09/01/2022     Lab Results   Component Value Date    LDLCALC 51 09/05/2024    LDLCALC 63 10/16/2023     Lab Results   Component Value Date    TRIG 100 09/05/2024    TRIG 112 10/16/2023    TRIG 86 09/01/2022     No components found for: \"CHOLHDL\"   Lab Results   Component Value Date    TSH 1.58 10/16/2023     No results found for: \"ALBUR\", \"[id number]\"     Health Maintenance:       Assessment/Plan   Mr. Zimmerman is a 59year old " M with T2DM c/b retinopathy, HLD coming in for follow up  date of diagnosis: 15 years. Date of last HbA1c: Oct 2023 and results: 7.2%.   Had an A1c was 8.2% today morning.  Interval History:  A1c is 8% in april per patient so lantus was increased and BG improved   Saw podiatrist on terbinafine 3 months fungal infection improved signifcantly   Had issues with some medications and missed his meds for 1 month.  Was not checking his BG but was having frequent urination   Current DM Regimen:. Lantus 28 units   Alogliptin 25 mg switched to Januvia 100 mg daily (3-4 months)  Jardiance 25 mg  Metformin 1000 mg BID.   Atorvastatin 80 mg  Lisinopril 5 mg  Glucose Ranges: In am: [phone]---110-125  Before dinner: 115-120  No low BG   Diabetes Surveillance: Eye exam: 2024 Has retinopathy mild and has been following with ophthalmology twice yearly. Vision improved  Foot exam/podiatrist: Was Following with podiatry last a year ago. Numbness tingling improved  Cardiovascular: no coronary artery bypass graft and no coronary artery disease.   LDL: 51 on Atorvastatin 80  Renal: no nephropathy and no end stage renal disease. Jardiance and lisinopril  Neurologic: no neuropathy.   Plan:  Continue Lantus 28 units daily  If BG in am frequently less than 80 decrease to 24 units  Continue Metformin and Sitagliptin   Continue Jardiance 25 mg daily  Continue Atorvastatin 80 mg daily  Continue Lisinopril   Follow up Ophthalmology     RTC in 6 months  Assessment & Plan  Type 2 diabetes mellitus without complication, without long-term current use of insulin (Multi)         Mixed hyperlipidemia                  28-Oct-2017

## 2024-09-17 ENCOUNTER — APPOINTMENT (OUTPATIENT)
Dept: PRIMARY CARE | Facility: CLINIC | Age: 59
End: 2024-09-17
Payer: COMMERCIAL

## 2024-09-17 VITALS
WEIGHT: 192 LBS | SYSTOLIC BLOOD PRESSURE: 120 MMHG | BODY MASS INDEX: 27.55 KG/M2 | DIASTOLIC BLOOD PRESSURE: 76 MMHG | HEART RATE: 72 BPM

## 2024-09-17 DIAGNOSIS — Z23 NEED FOR PNEUMOCOCCAL 20-VALENT CONJUGATE VACCINATION: Primary | ICD-10-CM

## 2024-09-17 DIAGNOSIS — Z23 NEED FOR INFLUENZA VACCINATION: ICD-10-CM

## 2024-09-17 DIAGNOSIS — I10 BENIGN ESSENTIAL HYPERTENSION: ICD-10-CM

## 2024-09-17 DIAGNOSIS — E78.2 MIXED HYPERLIPIDEMIA: ICD-10-CM

## 2024-09-17 DIAGNOSIS — E11.9 TYPE 2 DIABETES MELLITUS WITHOUT COMPLICATION, UNSPECIFIED WHETHER LONG TERM INSULIN USE (MULTI): ICD-10-CM

## 2024-09-17 PROCEDURE — 3062F POS MACROALBUMINURIA REV: CPT | Performed by: INTERNAL MEDICINE

## 2024-09-17 PROCEDURE — 90471 IMMUNIZATION ADMIN: CPT | Performed by: INTERNAL MEDICINE

## 2024-09-17 PROCEDURE — 3051F HG A1C>EQUAL 7.0%<8.0%: CPT | Performed by: INTERNAL MEDICINE

## 2024-09-17 PROCEDURE — 90677 PCV20 VACCINE IM: CPT | Performed by: INTERNAL MEDICINE

## 2024-09-17 PROCEDURE — 90472 IMMUNIZATION ADMIN EACH ADD: CPT | Performed by: INTERNAL MEDICINE

## 2024-09-17 PROCEDURE — 3078F DIAST BP <80 MM HG: CPT | Performed by: INTERNAL MEDICINE

## 2024-09-17 PROCEDURE — 4010F ACE/ARB THERAPY RXD/TAKEN: CPT | Performed by: INTERNAL MEDICINE

## 2024-09-17 PROCEDURE — 3048F LDL-C <100 MG/DL: CPT | Performed by: INTERNAL MEDICINE

## 2024-09-17 PROCEDURE — 99213 OFFICE O/P EST LOW 20 MIN: CPT | Performed by: INTERNAL MEDICINE

## 2024-09-17 PROCEDURE — 3074F SYST BP LT 130 MM HG: CPT | Performed by: INTERNAL MEDICINE

## 2024-09-17 PROCEDURE — 90686 IIV4 VACC NO PRSV 0.5 ML IM: CPT | Performed by: INTERNAL MEDICINE

## 2024-09-17 ASSESSMENT — ENCOUNTER SYMPTOMS
SHORTNESS OF BREATH: 0
TREMORS: 0
VOMITING: 0
HEADACHES: 0
CHEST TIGHTNESS: 0
EYE PAIN: 0
MYALGIAS: 0
POLYPHAGIA: 0
FACIAL SWELLING: 0
WEAKNESS: 0
STRIDOR: 0
NUMBNESS: 0
COUGH: 0
ARTHRALGIAS: 0
FATIGUE: 0
DIFFICULTY URINATING: 0
DIAPHORESIS: 0
DIARRHEA: 0
TROUBLE SWALLOWING: 0
BLOOD IN STOOL: 0
SEIZURES: 0
APPETITE CHANGE: 0
SLEEP DISTURBANCE: 0
RHINORRHEA: 0
WOUND: 0
POLYDIPSIA: 0
CONSTIPATION: 0
EYE ITCHING: 0
HEMATURIA: 0
BRUISES/BLEEDS EASILY: 0
CHILLS: 0
VOICE CHANGE: 0
DIZZINESS: 0
COLOR CHANGE: 0
SORE THROAT: 0
EYE REDNESS: 0
FLANK PAIN: 0
BACK PAIN: 0
DYSURIA: 0
PALPITATIONS: 0
FREQUENCY: 0
CHOKING: 0
SINUS PAIN: 0
WHEEZING: 0
ANAL BLEEDING: 0
ABDOMINAL DISTENTION: 0
LIGHT-HEADEDNESS: 0
RECTAL PAIN: 0
NECK PAIN: 0
NECK STIFFNESS: 0
SPEECH DIFFICULTY: 0
JOINT SWELLING: 0
ABDOMINAL PAIN: 0
SINUS PRESSURE: 0
NAUSEA: 0
PHOTOPHOBIA: 0
EYE DISCHARGE: 0
ADENOPATHY: 0
ACTIVITY CHANGE: 0
FACIAL ASYMMETRY: 0

## 2024-09-17 NOTE — PROGRESS NOTES
Subjective   Patient ID: Thomas Zimmerman is a 59 y.o. male who presents for No chief complaint on file..    Patient presents for follow-up.  He has been compliant with his medications, diet and exercise.  He overall feels well.  He denies any headaches, no dizziness, no sinus problems, no chest pain or shortness of breath.  He denies abdominal pain no nausea vomiting or diarrhea.  He reports no new musculoskeletal complaints.         Review of Systems   Constitutional:  Negative for activity change, appetite change, chills, diaphoresis and fatigue.   HENT:  Negative for congestion, dental problem, drooling, ear discharge, ear pain, facial swelling, hearing loss, mouth sores, nosebleeds, postnasal drip, rhinorrhea, sinus pressure, sinus pain, sneezing, sore throat, tinnitus, trouble swallowing and voice change.    Eyes:  Negative for photophobia, pain, discharge, redness, itching and visual disturbance.   Respiratory:  Negative for cough, choking, chest tightness, shortness of breath, wheezing and stridor.    Cardiovascular:  Negative for chest pain, palpitations and leg swelling.   Gastrointestinal:  Negative for abdominal distention, abdominal pain, anal bleeding, blood in stool, constipation, diarrhea, nausea, rectal pain and vomiting.   Endocrine: Negative for cold intolerance, heat intolerance, polydipsia, polyphagia and polyuria.   Genitourinary:  Negative for decreased urine volume, difficulty urinating, dysuria, enuresis, flank pain, frequency, genital sores, hematuria and urgency.   Musculoskeletal:  Negative for arthralgias, back pain, gait problem, joint swelling, myalgias, neck pain and neck stiffness.   Skin:  Negative for color change, pallor, rash and wound.   Neurological:  Negative for dizziness, tremors, seizures, syncope, facial asymmetry, speech difficulty, weakness, light-headedness, numbness and headaches.   Hematological:  Negative for adenopathy. Does not bruise/bleed easily.    Psychiatric/Behavioral:  Negative for sleep disturbance.        Objective   /76   Pulse 72   Wt 87.1 kg (192 lb)   BMI 27.55 kg/m²     Physical Exam  Constitutional:       Appearance: Normal appearance.   Cardiovascular:      Rate and Rhythm: Normal rate and regular rhythm.      Heart sounds: No murmur heard.     No gallop.   Pulmonary:      Effort: No respiratory distress.      Breath sounds: No wheezing or rales.   Abdominal:      General: There is no distension.      Palpations: There is no mass.      Tenderness: There is no abdominal tenderness. There is no guarding.   Musculoskeletal:      Right lower leg: No edema.      Left lower leg: No edema.   Neurological:      Mental Status: He is alert.         Assessment/Plan   Diagnoses and all orders for this visit:  Need for pneumococcal 20-valent conjugate vaccination  Need for influenza vaccination  Benign essential hypertension-low-salt diet and exercise  Mixed hyperlipidemia-we will continue with statin  Type 2 diabetes mellitus without complication, unspecified whether long term insulin use (Multi)-hemoglobin A1c was 7.2.  Ophthalmology appointment has been done.  He will follow-up with endocrinology.  Health maintenance-will give a flu and pneumonia shot today.  Will get the COVID-vaccine at the pharmacy..  Cologuard has been done.

## 2024-10-18 PROCEDURE — RXMED WILLOW AMBULATORY MEDICATION CHARGE

## 2024-10-22 ENCOUNTER — PHARMACY VISIT (OUTPATIENT)
Dept: PHARMACY | Facility: CLINIC | Age: 59
End: 2024-10-22
Payer: COMMERCIAL

## 2024-12-10 ENCOUNTER — APPOINTMENT (OUTPATIENT)
Dept: PRIMARY CARE | Facility: CLINIC | Age: 59
End: 2024-12-10
Payer: COMMERCIAL

## 2024-12-10 VITALS
SYSTOLIC BLOOD PRESSURE: 128 MMHG | HEART RATE: 76 BPM | DIASTOLIC BLOOD PRESSURE: 74 MMHG | BODY MASS INDEX: 27.64 KG/M2 | WEIGHT: 192.6 LBS | TEMPERATURE: 97.8 F

## 2024-12-10 DIAGNOSIS — E78.2 MIXED HYPERLIPIDEMIA: ICD-10-CM

## 2024-12-10 DIAGNOSIS — I10 BENIGN ESSENTIAL HYPERTENSION: Primary | ICD-10-CM

## 2024-12-10 DIAGNOSIS — E11.9 TYPE 2 DIABETES MELLITUS WITHOUT COMPLICATION, UNSPECIFIED WHETHER LONG TERM INSULIN USE (MULTI): ICD-10-CM

## 2024-12-10 LAB — POC HEMOGLOBIN A1C: 7.5 % (ref 4.2–6.5)

## 2024-12-10 PROCEDURE — 3051F HG A1C>EQUAL 7.0%<8.0%: CPT | Performed by: INTERNAL MEDICINE

## 2024-12-10 PROCEDURE — 4010F ACE/ARB THERAPY RXD/TAKEN: CPT | Performed by: INTERNAL MEDICINE

## 2024-12-10 PROCEDURE — 3062F POS MACROALBUMINURIA REV: CPT | Performed by: INTERNAL MEDICINE

## 2024-12-10 PROCEDURE — 3074F SYST BP LT 130 MM HG: CPT | Performed by: INTERNAL MEDICINE

## 2024-12-10 PROCEDURE — 99213 OFFICE O/P EST LOW 20 MIN: CPT | Performed by: INTERNAL MEDICINE

## 2024-12-10 PROCEDURE — 3078F DIAST BP <80 MM HG: CPT | Performed by: INTERNAL MEDICINE

## 2024-12-10 PROCEDURE — 3048F LDL-C <100 MG/DL: CPT | Performed by: INTERNAL MEDICINE

## 2024-12-10 PROCEDURE — 83036 HEMOGLOBIN GLYCOSYLATED A1C: CPT | Performed by: INTERNAL MEDICINE

## 2024-12-10 ASSESSMENT — ENCOUNTER SYMPTOMS
EYE ITCHING: 0
RHINORRHEA: 0
PALPITATIONS: 0
LIGHT-HEADEDNESS: 0
POLYDIPSIA: 0
FACIAL ASYMMETRY: 0
COUGH: 0
ADENOPATHY: 0
SEIZURES: 0
DIARRHEA: 0
RECTAL PAIN: 0
ANAL BLEEDING: 0
BLOOD IN STOOL: 0
FLANK PAIN: 0
DIFFICULTY URINATING: 0
PHOTOPHOBIA: 0
DIAPHORESIS: 0
SLEEP DISTURBANCE: 0
NECK STIFFNESS: 0
ABDOMINAL DISTENTION: 0
VOMITING: 0
JOINT SWELLING: 0
VOICE CHANGE: 0
SINUS PRESSURE: 0
ARTHRALGIAS: 0
WHEEZING: 0
COLOR CHANGE: 0
WEAKNESS: 0
SINUS PAIN: 0
POLYPHAGIA: 0
EYE REDNESS: 0
SHORTNESS OF BREATH: 0
DIZZINESS: 0
SORE THROAT: 0
NECK PAIN: 0
NUMBNESS: 0
CONSTIPATION: 0
APPETITE CHANGE: 0
CHOKING: 0
ACTIVITY CHANGE: 0
FREQUENCY: 0
ABDOMINAL PAIN: 0
SPEECH DIFFICULTY: 0
STRIDOR: 0
CHILLS: 0
BRUISES/BLEEDS EASILY: 0
TROUBLE SWALLOWING: 0
EYE PAIN: 0
EYE DISCHARGE: 0
BACK PAIN: 0
CHEST TIGHTNESS: 0
HEADACHES: 0
HEMATURIA: 0
WOUND: 0
MYALGIAS: 0
DYSURIA: 0
FACIAL SWELLING: 0
FATIGUE: 0
TREMORS: 0
NAUSEA: 0

## 2024-12-10 NOTE — PROGRESS NOTES
Subjective   Patient ID: Thomas Zimmerman is a 59 y.o. male who presents for Follow-up (Pt present today for 3 month follow up. ls).    Patient presents for follow-up.  He has been compliant with his medications but not diet or exercise.  He reports that his sugars are mildly elevated at home.  He overall feels well.  He denies any headaches, no dizziness, no chest pain or shortness of breath.  He denies abdominal pain no nausea vomiting or diarrhea.  He reports no new musculoskeletal complaints.         Review of Systems   Constitutional:  Negative for activity change, appetite change, chills, diaphoresis and fatigue.   HENT:  Negative for congestion, dental problem, drooling, ear discharge, ear pain, facial swelling, hearing loss, mouth sores, nosebleeds, postnasal drip, rhinorrhea, sinus pressure, sinus pain, sneezing, sore throat, tinnitus, trouble swallowing and voice change.    Eyes:  Negative for photophobia, pain, discharge, redness, itching and visual disturbance.   Respiratory:  Negative for cough, choking, chest tightness, shortness of breath, wheezing and stridor.    Cardiovascular:  Negative for chest pain, palpitations and leg swelling.   Gastrointestinal:  Negative for abdominal distention, abdominal pain, anal bleeding, blood in stool, constipation, diarrhea, nausea, rectal pain and vomiting.   Endocrine: Negative for cold intolerance, heat intolerance, polydipsia, polyphagia and polyuria.   Genitourinary:  Negative for decreased urine volume, difficulty urinating, dysuria, enuresis, flank pain, frequency, genital sores, hematuria and urgency.   Musculoskeletal:  Negative for arthralgias, back pain, gait problem, joint swelling, myalgias, neck pain and neck stiffness.   Skin:  Negative for color change, pallor, rash and wound.   Neurological:  Negative for dizziness, tremors, seizures, syncope, facial asymmetry, speech difficulty, weakness, light-headedness, numbness and headaches.   Hematological:   Negative for adenopathy. Does not bruise/bleed easily.   Psychiatric/Behavioral:  Negative for sleep disturbance.        Objective   /74   Pulse 76   Temp 36.6 °C (97.8 °F)   Wt 87.4 kg (192 lb 9.6 oz)   BMI 27.64 kg/m²     Physical Exam  Constitutional:       Appearance: Normal appearance.   Cardiovascular:      Rate and Rhythm: Normal rate and regular rhythm.      Heart sounds: No murmur heard.     No gallop.   Pulmonary:      Effort: No respiratory distress.      Breath sounds: No wheezing or rales.   Abdominal:      General: There is no distension.      Palpations: There is no mass.      Tenderness: There is no abdominal tenderness. There is no guarding.   Musculoskeletal:      Right lower leg: No edema.      Left lower leg: No edema.   Neurological:      Mental Status: He is alert.         Assessment/Plan   Diagnoses and all orders for this visit:  Benign essential hypertension-stable on present medications  Type 2 diabetes mellitus without complication, unspecified whether long term insulin use (Multi)-hemoglobin A1c was 7.5.  Will increase insulin to 35 units.  He will discuss with endocrinology.  He will consider GLP-1.  Ophthalmology appointment has been done.  -     POCT glycosylated hemoglobin (Hb A1C) manually resulted  Mixed hyperlipidemia-we will continue with statin  Health maintenance-immunizations are up-to-date.  Cologuard later this year.  Eye, dental and dermatology appointment have been done.

## 2025-01-15 DIAGNOSIS — E11.9 TYPE 2 DIABETES MELLITUS WITHOUT COMPLICATION, WITHOUT LONG-TERM CURRENT USE OF INSULIN (MULTI): Primary | ICD-10-CM

## 2025-01-15 RX ORDER — SITAGLIPTIN 100 MG/1
100 TABLET ORAL DAILY
Qty: 90 TABLET | Refills: 3 | Status: SHIPPED | OUTPATIENT
Start: 2025-01-15

## 2025-01-28 DIAGNOSIS — E11.9 TYPE 2 DIABETES MELLITUS WITHOUT COMPLICATION, WITHOUT LONG-TERM CURRENT USE OF INSULIN (MULTI): Primary | ICD-10-CM

## 2025-01-28 RX ORDER — SITAGLIPTIN 100 MG/1
100 TABLET ORAL DAILY
Qty: 90 TABLET | Refills: 3 | Status: SHIPPED | OUTPATIENT
Start: 2025-01-28

## 2025-02-11 DIAGNOSIS — E11.9 TYPE 2 DIABETES MELLITUS WITHOUT COMPLICATION, WITHOUT LONG-TERM CURRENT USE OF INSULIN (MULTI): ICD-10-CM

## 2025-02-11 RX ORDER — SITAGLIPTIN 100 MG/1
100 TABLET ORAL DAILY
Qty: 90 TABLET | Refills: 3 | Status: SHIPPED | OUTPATIENT
Start: 2025-02-11

## 2025-02-28 DIAGNOSIS — E11.9 TYPE 2 DIABETES MELLITUS WITHOUT COMPLICATION, WITHOUT LONG-TERM CURRENT USE OF INSULIN (MULTI): ICD-10-CM

## 2025-03-03 ENCOUNTER — APPOINTMENT (OUTPATIENT)
Dept: ENDOCRINOLOGY | Facility: CLINIC | Age: 60
End: 2025-03-03
Payer: COMMERCIAL

## 2025-03-03 VITALS
HEART RATE: 74 BPM | BODY MASS INDEX: 28.58 KG/M2 | WEIGHT: 193 LBS | HEIGHT: 69 IN | SYSTOLIC BLOOD PRESSURE: 129 MMHG | DIASTOLIC BLOOD PRESSURE: 67 MMHG

## 2025-03-03 DIAGNOSIS — E11.9 TYPE 2 DIABETES MELLITUS WITHOUT COMPLICATION, WITHOUT LONG-TERM CURRENT USE OF INSULIN (MULTI): Primary | ICD-10-CM

## 2025-03-03 DIAGNOSIS — E78.2 MIXED HYPERLIPIDEMIA: ICD-10-CM

## 2025-03-03 DIAGNOSIS — G62.9 NEUROPATHY: ICD-10-CM

## 2025-03-03 LAB — POC HEMOGLOBIN A1C: 8.4 % (ref 4.2–6.5)

## 2025-03-03 PROCEDURE — 3008F BODY MASS INDEX DOCD: CPT | Performed by: STUDENT IN AN ORGANIZED HEALTH CARE EDUCATION/TRAINING PROGRAM

## 2025-03-03 PROCEDURE — 83036 HEMOGLOBIN GLYCOSYLATED A1C: CPT | Performed by: STUDENT IN AN ORGANIZED HEALTH CARE EDUCATION/TRAINING PROGRAM

## 2025-03-03 PROCEDURE — 3074F SYST BP LT 130 MM HG: CPT | Performed by: STUDENT IN AN ORGANIZED HEALTH CARE EDUCATION/TRAINING PROGRAM

## 2025-03-03 PROCEDURE — 99215 OFFICE O/P EST HI 40 MIN: CPT | Performed by: STUDENT IN AN ORGANIZED HEALTH CARE EDUCATION/TRAINING PROGRAM

## 2025-03-03 PROCEDURE — 3078F DIAST BP <80 MM HG: CPT | Performed by: STUDENT IN AN ORGANIZED HEALTH CARE EDUCATION/TRAINING PROGRAM

## 2025-03-03 PROCEDURE — 1036F TOBACCO NON-USER: CPT | Performed by: STUDENT IN AN ORGANIZED HEALTH CARE EDUCATION/TRAINING PROGRAM

## 2025-03-03 PROCEDURE — 4010F ACE/ARB THERAPY RXD/TAKEN: CPT | Performed by: STUDENT IN AN ORGANIZED HEALTH CARE EDUCATION/TRAINING PROGRAM

## 2025-03-03 ASSESSMENT — PAIN SCALES - GENERAL: PAINLEVEL_OUTOF10: 0-NO PAIN

## 2025-03-03 NOTE — PROGRESS NOTES
"Patient coming in for follow up for T2DM    Subjective   Addy Zimmerman \"Thomas\" is a 59 y.o. male who presents for follow up for Type 2 diabetes mellitus.   Lab Results   Component Value Date    HGBA1C 7.5 (A) 12/10/2024      Mr. Zimmerman is a 59year old M with T2DM c/b retinopathy, HLD coming in for follow up  date of diagnosis: 15 years. Date of last HbA1c: Today 8.4%.   Interval History:  A1c is 8% in april per patient so lantus was increased and BG improved   Saw podiatrist on terbinafine 3 months fungal infection improved signifcantly   Has been having issues with Januvia coverage  Current DM Regimen:. Lantus 34 units   Januvia 100 mg haven't been taking it since not covered/  Jardiance 25 mg  Metformin 1000 mg BID.   Atorvastatin 80 mg  Lisinopril 5 mg  Glucose Ranges: In am: B64--80  Before dinner:223--146-188-121  Low BG in am 73-70-75 No symptoms   Eating 3 meals a day (Early morning- 12- 6pm) works from 9-5 pm.   January and  8-6 pm  Diabetes Surveillance: Eye exam: 2024 Has retinopathy mild. Vision improved  Foot exam/podiatrist: Was Following with podiatry but not recent. Numbness and tingling worsened in the last 3-4 months  Cardiovascular: no coronary artery bypass graft and no coronary artery disease.   LDL: 51 on Atorvastatin 80  Renal: no nephropathy and no end stage renal disease. Jardiance and lisinopril  Neurologic: no neuropathy.     Review of Systems  all pertinent systems reviewed and are otherwise negative   Objective   /67   Pulse 74   Ht 1.753 m (5' 9\")   Wt 87.5 kg (193 lb)   BMI 28.50 kg/m²   Physical Exam  Constitutional:       General: He is not in acute distress.     Appearance: Normal appearance.   HENT:      Head: Normocephalic and atraumatic.   Eyes:      Extraocular Movements: Extraocular movements intact.      Pupils: Pupils are equal, round, and reactive to light.   Cardiovascular:      Rate and Rhythm: Normal rate and regular rhythm.      Pulses:    "        Dorsalis pedis pulses are 2+ on the right side and 2+ on the left side.   Pulmonary:      Effort: Pulmonary effort is normal. No respiratory distress.      Breath sounds: Normal breath sounds.   Abdominal:      General: Bowel sounds are normal.      Palpations: Abdomen is soft.      Tenderness: There is no abdominal tenderness.   Feet:      Right foot:      Protective Sensation: 8 sites tested.  8 sites sensed.      Skin integrity: Skin integrity normal.      Left foot:      Protective Sensation: 8 sites tested.  8 sites sensed.      Skin integrity: Skin integrity normal.   Skin:     Coloration: Skin is not jaundiced or pale.      Findings: No erythema or rash.   Neurological:      General: No focal deficit present.      Mental Status: He is alert and oriented to person, place, and time.      Deep Tendon Reflexes: Reflexes normal.   Psychiatric:         Mood and Affect: Mood normal.         Behavior: Behavior normal.         Lab Review  Glucose (mg/dL)   Date Value   09/05/2024 103 (H)   03/12/2024 122 (H)   10/16/2023 111 (H)     POC HEMOGLOBIN A1c (%)   Date Value   12/10/2024 7.5 (A)   06/12/2024 8.0 (A)   07/13/2023 7.0 (A)     Hemoglobin A1C (%)   Date Value   09/05/2024 7.2 (H)   03/12/2024 7.8 (H)   10/16/2023 7.2 (H)     Bicarbonate (mmol/L)   Date Value   09/05/2024 30   03/12/2024 30   10/16/2023 27     Urea Nitrogen (mg/dL)   Date Value   09/05/2024 16   03/12/2024 18   10/16/2023 20     Creatinine (mg/dL)   Date Value   09/05/2024 0.89   03/12/2024 0.89   10/16/2023 0.82     Lab Results   Component Value Date    CHOL 112 09/05/2024    CHOL 127 10/16/2023    CHOL 144 09/01/2022     Lab Results   Component Value Date    HDL 41.4 09/05/2024    HDL 41.2 10/16/2023    HDL 47.6 09/01/2022     Lab Results   Component Value Date    LDLCALC 51 09/05/2024    LDLCALC 63 10/16/2023     Lab Results   Component Value Date    TRIG 100 09/05/2024    TRIG 112 10/16/2023    TRIG 86 09/01/2022     No components  "found for: \"CHOLHDL\"   Lab Results   Component Value Date    TSH 1.58 10/16/2023     No results found for: \"ALBUR\", \"[id number]\"     Health Maintenance:       Assessment/Plan   Mr. Zimmerman is a 59year old M with T2DM c/b retinopathy, HLD coming in for follow up  date of diagnosis: 15 years. Date of last HbA1c: Today 8.4%.   Interval History:  A1c is 8% in april per patient so lantus was increased and BG improved   Saw podiatrist on terbinafine 3 months fungal infection improved signifcantly   Has been having issues with Januvia coverage  Current DM Regimen:. Lantus 34 units   Januvia 100 mg haven't been taking it since not covered/  Jardiance 25 mg  Metformin 1000 mg BID.   Atorvastatin 80 mg  Lisinopril 5 mg  Glucose Ranges: In am: B44--80  Before dinner:223--146-188-121  Low BG in am 73-70-75 No symptoms   Diabetes Surveillance: Eye exam: 2024 Has retinopathy mild. Vision improved  Foot exam/podiatrist: Was Following with podiatry but not recent. Numbness and tingling worsened in the last 3-4 months  Cardiovascular: no coronary artery bypass graft and no coronary artery disease.   LDL: 51 on Atorvastatin 80  Renal: no nephropathy and no end stage renal disease. Jardiance and lisinopril  Neurologic: no neuropathy.   Plan:  Decrease Lantus to 30 units daily  If BG in am frequently less than 80 decrease to 28 units  Start Rybelsus 3 mg once daily for 30 days, then increase to 7 mg once daily  Continue Metformin  Continue Jardiance 25 mg daily  Continue Atorvastatin 80 mg daily  Continue Lisinopril   Follow up Ophthalmology     Blood work    RTC in 6 months  Assessment & Plan  Mixed hyperlipidemia    Orders:    Lipid Panel; Future    Type 2 diabetes mellitus without complication, without long-term current use of insulin (Multi)    Orders:    semaglutide (Rybelsus) 7 mg tablet; Take 1 tablet (7 mg) by mouth once daily.    Renal Function Panel; Future    Lipid Panel; Future    Albumin-Creatinine Ratio, " Urine Random; Future    Vitamin B12; Future    Tsh With Reflex To Free T4 If Abnormal; Future    POCT glycosylated hemoglobin (Hb A1C) manually resulted    Neuropathy    Orders:    Vitamin B12; Future    Tsh With Reflex To Free T4 If Abnormal; Future

## 2025-03-03 NOTE — PATIENT INSTRUCTIONS
Decrease Lantus to 30 units daily  If BG in am frequently less than 80 decrease to 28 units  Start Rybelsus 3 mg once daily for 30 days, then increase to 7 mg once daily  Administer =30 minutes before the first food, beverage, or other medications of the day.  Note: The lower initial dose (3 mg daily) is intended to reduce GI symptoms; it does not provide effective glycemic control.  Missed dose: Missed dose should be skipped; resume at the next scheduled dose  Continue Metformin  Continue Jardiance 25 mg daily  Continue Atorvastatin 80 mg daily  Continue Lisinopril   Follow up Ophthalmology     Blood work    RTC in 6 months

## 2025-03-04 RX ORDER — PEN NEEDLE, DIABETIC 30 GX3/16"
NEEDLE, DISPOSABLE MISCELLANEOUS
Qty: 100 EACH | Refills: 1 | Status: SHIPPED | OUTPATIENT
Start: 2025-03-04

## 2025-03-04 NOTE — ASSESSMENT & PLAN NOTE
Orders:    semaglutide (Rybelsus) 7 mg tablet; Take 1 tablet (7 mg) by mouth once daily.    Renal Function Panel; Future    Lipid Panel; Future    Albumin-Creatinine Ratio, Urine Random; Future    Vitamin B12; Future    Tsh With Reflex To Free T4 If Abnormal; Future    POCT glycosylated hemoglobin (Hb A1C) manually resulted

## 2025-03-20 DIAGNOSIS — E11.9 TYPE 2 DIABETES MELLITUS WITHOUT COMPLICATION, WITHOUT LONG-TERM CURRENT USE OF INSULIN (MULTI): ICD-10-CM

## 2025-03-20 RX ORDER — EMPAGLIFLOZIN 25 MG/1
25 TABLET, FILM COATED ORAL DAILY
Qty: 90 TABLET | Refills: 1 | Status: SHIPPED | OUTPATIENT
Start: 2025-03-20

## 2025-03-31 DIAGNOSIS — N52.9 INABILITY TO ATTAIN ERECTION: ICD-10-CM

## 2025-04-01 PROCEDURE — RXMED WILLOW AMBULATORY MEDICATION CHARGE

## 2025-04-01 RX ORDER — SILDENAFIL 100 MG/1
TABLET, FILM COATED ORAL
Qty: 10 TABLET | Refills: 14 | Status: SHIPPED | OUTPATIENT
Start: 2025-04-01 | End: 2026-03-31

## 2025-04-03 ENCOUNTER — PHARMACY VISIT (OUTPATIENT)
Dept: PHARMACY | Facility: CLINIC | Age: 60
End: 2025-04-03
Payer: COMMERCIAL

## 2025-06-30 ENCOUNTER — APPOINTMENT (OUTPATIENT)
Dept: PRIMARY CARE | Facility: CLINIC | Age: 60
End: 2025-06-30
Payer: COMMERCIAL

## 2025-06-30 VITALS
WEIGHT: 185.4 LBS | SYSTOLIC BLOOD PRESSURE: 118 MMHG | DIASTOLIC BLOOD PRESSURE: 76 MMHG | BODY MASS INDEX: 27.38 KG/M2 | HEART RATE: 72 BPM | TEMPERATURE: 97.5 F

## 2025-06-30 DIAGNOSIS — Z00.00 HEALTH CARE MAINTENANCE: ICD-10-CM

## 2025-06-30 DIAGNOSIS — E78.2 MIXED HYPERLIPIDEMIA: ICD-10-CM

## 2025-06-30 DIAGNOSIS — E11.42 DIABETIC POLYNEUROPATHY ASSOCIATED WITH TYPE 2 DIABETES MELLITUS: ICD-10-CM

## 2025-06-30 DIAGNOSIS — I10 BENIGN ESSENTIAL HYPERTENSION: Primary | ICD-10-CM

## 2025-06-30 DIAGNOSIS — E11.69 TYPE 2 DIABETES MELLITUS WITH OTHER SPECIFIED COMPLICATION, UNSPECIFIED WHETHER LONG TERM INSULIN USE (MULTI): ICD-10-CM

## 2025-06-30 DIAGNOSIS — Z12.11 SCREENING FOR COLON CANCER: ICD-10-CM

## 2025-06-30 LAB
POC FINGERSTICK BLOOD GLUCOSE: 77 MG/DL (ref 70–100)
POC HEMOGLOBIN A1C: 7 % (ref 4.2–6.5)

## 2025-06-30 PROCEDURE — 4010F ACE/ARB THERAPY RXD/TAKEN: CPT | Performed by: INTERNAL MEDICINE

## 2025-06-30 PROCEDURE — 3074F SYST BP LT 130 MM HG: CPT | Performed by: INTERNAL MEDICINE

## 2025-06-30 PROCEDURE — 3051F HG A1C>EQUAL 7.0%<8.0%: CPT | Performed by: INTERNAL MEDICINE

## 2025-06-30 PROCEDURE — 82962 GLUCOSE BLOOD TEST: CPT | Performed by: INTERNAL MEDICINE

## 2025-06-30 PROCEDURE — 99396 PREV VISIT EST AGE 40-64: CPT | Performed by: INTERNAL MEDICINE

## 2025-06-30 PROCEDURE — 83036 HEMOGLOBIN GLYCOSYLATED A1C: CPT | Performed by: INTERNAL MEDICINE

## 2025-06-30 PROCEDURE — 3078F DIAST BP <80 MM HG: CPT | Performed by: INTERNAL MEDICINE

## 2025-06-30 PROCEDURE — 93000 ELECTROCARDIOGRAM COMPLETE: CPT | Performed by: INTERNAL MEDICINE

## 2025-06-30 RX ORDER — ATORVASTATIN CALCIUM 80 MG/1
80 TABLET, FILM COATED ORAL DAILY
Qty: 90 TABLET | Refills: 3 | Status: SHIPPED | OUTPATIENT
Start: 2025-06-30

## 2025-06-30 RX ORDER — METFORMIN HYDROCHLORIDE 1000 MG/1
TABLET ORAL
Qty: 180 TABLET | Refills: 3 | Status: SHIPPED | OUTPATIENT
Start: 2025-06-30

## 2025-06-30 RX ORDER — LISINOPRIL 5 MG/1
5 TABLET ORAL
Qty: 90 TABLET | Refills: 3 | Status: SHIPPED | OUTPATIENT
Start: 2025-06-30

## 2025-06-30 ASSESSMENT — ENCOUNTER SYMPTOMS
WHEEZING: 0
TREMORS: 0
POLYDIPSIA: 0
RHINORRHEA: 0
NECK STIFFNESS: 0
TROUBLE SWALLOWING: 0
HEMATURIA: 0
HEADACHES: 0
DIZZINESS: 0
DIAPHORESIS: 0
NECK PAIN: 0
DIFFICULTY URINATING: 0
COLOR CHANGE: 0
WEAKNESS: 0
SINUS PRESSURE: 0
RECTAL PAIN: 0
VOMITING: 0
ADENOPATHY: 0
SPEECH DIFFICULTY: 0
FACIAL SWELLING: 0
PHOTOPHOBIA: 0
SLEEP DISTURBANCE: 0
STRIDOR: 0
PALPITATIONS: 0
BACK PAIN: 0
NUMBNESS: 0
WOUND: 0
FACIAL ASYMMETRY: 0
CHOKING: 0
NAUSEA: 0
COUGH: 0
LIGHT-HEADEDNESS: 0
POLYPHAGIA: 0
BRUISES/BLEEDS EASILY: 0
VOICE CHANGE: 0
CONSTIPATION: 0
SEIZURES: 0
EYE ITCHING: 0
MYALGIAS: 0
SINUS PAIN: 0
JOINT SWELLING: 0
DIARRHEA: 0
CHEST TIGHTNESS: 0
FREQUENCY: 0
DYSURIA: 0
SHORTNESS OF BREATH: 0
ABDOMINAL PAIN: 0
EYE PAIN: 0
ACTIVITY CHANGE: 0
SORE THROAT: 0
ANAL BLEEDING: 0
FATIGUE: 0
EYE DISCHARGE: 0
EYE REDNESS: 0
ARTHRALGIAS: 0
CHILLS: 0
ABDOMINAL DISTENTION: 0
BLOOD IN STOOL: 0
APPETITE CHANGE: 0
FLANK PAIN: 0

## 2025-06-30 NOTE — PROGRESS NOTES
"Subjective   Patient ID: Addy Zimmerman \"Homer" is a 59 y.o. male who presents for No chief complaint on file..  History of Present Illness  The patient presents for a physical exam.    He reports satisfactory blood sugar levels, although he experiences slightly low readings in the morning. He has been experiencing increased shakiness, which he suspects may be related to his blood sugar levels. His blood sugar level was 70 this morning, which he attributes to a light dinner the previous night. He has noticed some weight loss and admits to minimal exercise, with yard work being his primary physical activity.    He reports no symptoms of depression, headaches, dizziness, sinus issues, chest pain, shortness of breath, abdominal pain, nausea, vomiting, diarrhea, or constipation. His urinary function is normal, although he wakes up once at night to urinate. He maintains good hydration throughout the day and reports no issues with urine flow. His sleep pattern is generally good, despite some recent disturbances due to work stress. He reports no concerns with his sex drive.    He has not undergone a colonoscopy but has completed the Cologuard test. He has not consulted an ophthalmologist, dentist, or dermatologist recently. He is currently on Rybelsus 7 mg, which was initiated 3 months ago following an insurance change that prevented him from continuing Januvia. His current regimen also includes Jardiance, metformin, lisinopril, atorvastatin, daily aspirin, and insulin (26 to 28 units). He requires refills for his metformin, atorvastatin, and lisinopril prescriptions.    He has been experiencing severe pain in his left elbow, which he describes as a burning sensation similar to bursitis. He has not sought medical attention for this issue or attempted any self-treatment.    He has not been evaluated for sleep apnea and occasionally experiences daytime fatigue.    FAMILY HISTORY  His mother had arthritis. His father had " heart problems and required a pacemaker.      PMHx, FHx, Social Hx, Surg Hx personally reviewed at this appointment. No pertinent findings and/or changes from prior (if applicable).  Review of Systems   Constitutional:  Negative for activity change, appetite change, chills, diaphoresis and fatigue.   HENT:  Negative for congestion, dental problem, drooling, ear discharge, ear pain, facial swelling, hearing loss, mouth sores, nosebleeds, postnasal drip, rhinorrhea, sinus pressure, sinus pain, sneezing, sore throat, tinnitus, trouble swallowing and voice change.    Eyes:  Negative for photophobia, pain, discharge, redness, itching and visual disturbance.   Respiratory:  Negative for cough, choking, chest tightness, shortness of breath, wheezing and stridor.    Cardiovascular:  Negative for chest pain, palpitations and leg swelling.   Gastrointestinal:  Negative for abdominal distention, abdominal pain, anal bleeding, blood in stool, constipation, diarrhea, nausea, rectal pain and vomiting.   Endocrine: Negative for cold intolerance, heat intolerance, polydipsia, polyphagia and polyuria.   Genitourinary:  Negative for decreased urine volume, difficulty urinating, dysuria, enuresis, flank pain, frequency, genital sores, hematuria and urgency.   Musculoskeletal:  Negative for arthralgias (l elbow pain), back pain, gait problem, joint swelling, myalgias, neck pain and neck stiffness.   Skin:  Negative for color change, pallor, rash and wound.   Neurological:  Negative for dizziness, tremors, seizures, syncope, facial asymmetry, speech difficulty, weakness, light-headedness, numbness and headaches.   Hematological:  Negative for adenopathy. Does not bruise/bleed easily.   Psychiatric/Behavioral:  Negative for sleep disturbance.       ROS: Unless specified above, pt denies wt gain/loss f/c HA LoC CP SOB NVDC. See HPI above, and scanned sheet (if applicable). All other systems are reviewed and are without complaint.      Objective     /76   Pulse 72   Temp 36.4 °C (97.5 °F)   Wt 84.1 kg (185 lb 6.4 oz)   BMI 27.38 kg/m²      Physical Exam      Physical Exam  Constitutional:       General: He is not in acute distress.     Appearance: Normal appearance. He is normal weight. He is not ill-appearing, toxic-appearing or diaphoretic.   HENT:      Head: Normocephalic and atraumatic.      Right Ear: Tympanic membrane, ear canal and external ear normal. There is no impacted cerumen.      Left Ear: Tympanic membrane, ear canal and external ear normal. There is no impacted cerumen.      Nose: Nose normal. No congestion or rhinorrhea.      Mouth/Throat:      Mouth: Mucous membranes are moist.      Pharynx: Oropharynx is clear. No oropharyngeal exudate or posterior oropharyngeal erythema.   Eyes:      General: No scleral icterus.        Right eye: No discharge.         Left eye: No discharge.      Extraocular Movements: Extraocular movements intact.      Conjunctiva/sclera: Conjunctivae normal.      Pupils: Pupils are equal, round, and reactive to light.   Neck:      Vascular: No carotid bruit.   Cardiovascular:      Rate and Rhythm: Normal rate and regular rhythm.      Heart sounds: No murmur heard.     No friction rub. No gallop.   Pulmonary:      Effort: No respiratory distress.      Breath sounds: No stridor. No wheezing, rhonchi or rales.   Chest:      Chest wall: No tenderness.   Abdominal:      General: Abdomen is flat. Bowel sounds are normal. There is no distension.      Palpations: Abdomen is soft. There is no mass.      Tenderness: There is no abdominal tenderness. There is no right CVA tenderness, left CVA tenderness or guarding.      Hernia: No hernia is present.   Genitourinary:     Penis: Normal.       Testes: Normal.   Musculoskeletal:         General: No swelling, tenderness, deformity or signs of injury. Normal range of motion.      Cervical back: Normal range of motion. No rigidity or tenderness.      Right lower  leg: No edema.      Left lower leg: No edema.   Lymphadenopathy:      Cervical: No cervical adenopathy.   Skin:     General: Skin is warm and dry.      Coloration: Skin is not jaundiced or pale.      Findings: No bruising, erythema, lesion or rash.   Neurological:      General: No focal deficit present.      Mental Status: He is alert and oriented to person, place, and time. Mental status is at baseline.      Cranial Nerves: No cranial nerve deficit.      Sensory: No sensory deficit.      Motor: No weakness.      Coordination: Coordination normal.      Gait: Gait normal.      Deep Tendon Reflexes: Reflexes normal.   Psychiatric:         Mood and Affect: Mood normal.         Behavior: Behavior normal.         Thought Content: Thought content normal.         Judgment: Judgment normal.        Lab Results   Component Value Date    WBC 8.7 09/05/2024    HGB 16.2 09/05/2024    HCT 50.3 09/05/2024     09/05/2024    CHOL 112 09/05/2024    TRIG 100 09/05/2024    HDL 41.4 09/05/2024    ALT 25 10/16/2023    AST 14 10/16/2023     09/05/2024    K 4.8 09/05/2024     09/05/2024    CREATININE 0.89 09/05/2024    BUN 16 09/05/2024    CO2 30 09/05/2024    TSH 1.58 10/16/2023    PSA 1.43 09/05/2024    HGBA1C 7.0 (A) 06/30/2025     par     Current Outpatient Medications   Medication Instructions    aspirin 81 mg capsule Take by mouth. TAKE PER DIRECTED    atorvastatin (LIPITOR) 80 mg, oral, Daily    blood sugar diagnostic (Accu-Chek Guide test strips) strip Check BG 2-3 times a day    blood sugar diagnostic (Accu-Chek Guide test strips) strip 1 strip, Daily    cholecalciferol (VITAMIN D3) 1,000 Units, Daily    coenzyme Q-10 (CO Q-10) 10 mg, oral, Daily, Dose is 1000 mg    empagliflozin (JARDIANCE) 25 mg, oral, Daily    lancets misc Check BG 2-3 times    Lantus Solostar U-100 Insulin 28 Units, subcutaneous, Nightly, UP TO 30 UNITS PER DIRECTED    lisinopril 5 mg, oral, Daily RT    metFORMIN (Glucophage) 1,000 mg tablet  "TAKE ONE TABLET WITH AM AND PM MEALS    pen needle, diabetic (BD Ramila 2nd Gen Pen Needle) 32 gauge x 5/32\" needle USE DAILY WITH LANTUS    semaglutide (RYBELSUS) 7 mg, oral, Daily    sildenafil (Viagra) 100 mg tablet TAKE ONE-HALF (1/2) TABLET BY MOUTH DAILY ONE HOUR BEFORE NEEDED        DIGITAL DIAG MAMM  MRN: [id number]  Patient Name: JUAQUIN BAINS     STUDY:  DIGITAL DIAG MAMM BILAT WITH KATHARINE;  2/21/2022 11:37 am     ACCESSION NUMBER(S):  [id number]     ORDERING CLINICIAN:  AMY MOHAMUD     INDICATION:  L breast tenderness  N64.4: Breast tenderness in male.     COMPARISON:  None.     FINDINGS:  2D and tomosynthesis images were reviewed at 1 mm slice thickness.     The breast tissue is almost entirely fatty.  56-year-old male  presents with a tender palpable abnormality in the subareolar region  of the left breast. Mammographically the findings in the subareolar  region the left breast are compatible with benign gynecomastia. To a  lesser degree gynecomastia is noted in the right breast. There are no  suspicious masses, calcifications, or areas of distortion noted.     IMPRESSION:  Tender palpable abnormality correlates to benign gynecomastia.  Incidental gynecomastia is also not on the right. Clinical  correlation is recommended     BI-RADS CATEGORY:     Category: 2 - Benign.  Recommendation: Correlation     For any future breast imaging appointments, please call [phone]  ([phone]).     Patient letter sent [email or web address]              Assessment & Plan  1. Hypertension.  - Advised to maintain a low-salt diet and engage in regular exercise.  - Blood pressure readings will be monitored.  - Prescription for lisinopril will be sent to [facility] for a 90-day supply.    2. Hyperlipidemia.  - Fasting lipid profile will be obtained.  - Lipid levels will be monitored.  - Prescription for atorvastatin will be sent to [facility] for a 90-day supply.    3. Diabetes Mellitus.  - Hemoglobin A1c level has improved to 7.0.  - Blood " glucose levels will be monitored regularly.  - Continuing current medications: Rybelsus 7 mg, Jardiance, metformin, and insulin (26-28 units).  - Ophthalmology appointment will be scheduled. Follow-up with endocrinology in 3 months.    4. Elbow Pain.  - Symptoms suggestive of bursitis or arthritis.  - Physical exam findings indicate localized pain.  - Trial of ibuprofen for symptom management.  - Contact office if no improvement.    5. Health Maintenance.  - Encouraged to maintain a healthy diet and regular exercise regimen.  - Cologuard test will be sent as due in 07/2025.  - Appointments with an eye doctor, dentist, and dermatologist will be scheduled.  - Immunizations are up to date.  - Blood and urine tests will be conducted.    Follow-up  The patient will follow up in 6 months.          Abhijeet Simeon MD       This medical note was created with the assistance of artificial intelligence (AI) for documentation purposes. The content has been reviewed and confirmed by the healthcare provider for accuracy and completeness. Patient consented to the use of audio recording and use of AI during their visit.

## 2025-06-30 NOTE — PATIENT INSTRUCTIONS
Please take medication as prescribed.  Diet and exercise.  Follow-up in 6 months.  Obtain fasting blood work and urine.  Schedule eye, dental and dermatology appointment

## 2025-08-07 LAB — NONINV COLON CA DNA+OCC BLD SCRN STL QL: NEGATIVE

## 2025-08-21 PROCEDURE — RXMED WILLOW AMBULATORY MEDICATION CHARGE

## 2025-08-25 ENCOUNTER — TELEPHONE (OUTPATIENT)
Dept: ENDOCRINOLOGY | Facility: CLINIC | Age: 60
End: 2025-08-25
Payer: COMMERCIAL

## 2025-08-25 ENCOUNTER — PHARMACY VISIT (OUTPATIENT)
Dept: PHARMACY | Facility: CLINIC | Age: 60
End: 2025-08-25
Payer: COMMERCIAL

## 2025-08-25 DIAGNOSIS — E11.9 TYPE 2 DIABETES MELLITUS WITHOUT COMPLICATION, WITHOUT LONG-TERM CURRENT USE OF INSULIN: ICD-10-CM

## 2025-08-25 RX ORDER — BLOOD SUGAR DIAGNOSTIC
STRIP MISCELLANEOUS
Qty: 300 STRIP | Refills: 0 | Status: SHIPPED | OUTPATIENT
Start: 2025-08-25

## 2025-08-26 LAB
ALBUMIN SERPL-MCNC: 4.2 G/DL (ref 3.6–5.1)
ALBUMIN SERPL-MCNC: 4.2 G/DL (ref 3.6–5.1)
ALBUMIN/CREAT UR: 4 MG/G CREAT
ALP SERPL-CCNC: 105 U/L (ref 35–144)
ALT SERPL-CCNC: 21 U/L (ref 9–46)
ANION GAP SERPL CALCULATED.4IONS-SCNC: 9 MMOL/L (CALC) (ref 7–17)
APPEARANCE UR: CLEAR
AST SERPL-CCNC: 16 U/L (ref 10–35)
BASOPHILS # BLD AUTO: 37 CELLS/UL (ref 0–200)
BASOPHILS NFR BLD AUTO: 0.5 %
BILIRUB SERPL-MCNC: 0.5 MG/DL (ref 0.2–1.2)
BILIRUB UR QL STRIP: NEGATIVE
BUN SERPL-MCNC: 18 MG/DL (ref 7–25)
BUN SERPL-MCNC: 19 MG/DL (ref 7–25)
BUN/CREAT SERPL: ABNORMAL (CALC) (ref 6–22)
CALCIUM SERPL-MCNC: 9.3 MG/DL (ref 8.6–10.3)
CALCIUM SERPL-MCNC: 9.6 MG/DL (ref 8.6–10.3)
CHLORIDE SERPL-SCNC: 102 MMOL/L (ref 98–110)
CHLORIDE SERPL-SCNC: 102 MMOL/L (ref 98–110)
CHOLEST SERPL-MCNC: 107 MG/DL
CHOLEST SERPL-MCNC: 108 MG/DL
CHOLEST/HDLC SERPL: 2.5 (CALC)
CHOLEST/HDLC SERPL: 2.6 (CALC)
CO2 SERPL-SCNC: 27 MMOL/L (ref 20–32)
CO2 SERPL-SCNC: 28 MMOL/L (ref 20–32)
COLOR UR: YELLOW
CREAT SERPL-MCNC: 0.77 MG/DL (ref 0.7–1.35)
CREAT SERPL-MCNC: 0.84 MG/DL (ref 0.7–1.35)
CREAT UR-MCNC: 54 MG/DL (ref 20–320)
EGFRCR SERPLBLD CKD-EPI 2021: 100 ML/MIN/1.73M2
EGFRCR SERPLBLD CKD-EPI 2021: 102 ML/MIN/1.73M2
EOSINOPHIL # BLD AUTO: 111 CELLS/UL (ref 15–500)
EOSINOPHIL NFR BLD AUTO: 1.5 %
ERYTHROCYTE [DISTWIDTH] IN BLOOD BY AUTOMATED COUNT: 11.8 % (ref 11–15)
GLUCOSE SERPL-MCNC: 100 MG/DL (ref 65–99)
GLUCOSE SERPL-MCNC: 96 MG/DL (ref 65–99)
GLUCOSE UR QL STRIP: ABNORMAL
HCT VFR BLD AUTO: 49.5 % (ref 38.5–50)
HDLC SERPL-MCNC: 41 MG/DL
HDLC SERPL-MCNC: 42 MG/DL
HGB BLD-MCNC: 16.2 G/DL (ref 13.2–17.1)
HGB UR QL STRIP: NEGATIVE
KETONES UR QL STRIP: NEGATIVE
LDLC SERPL CALC-MCNC: 48 MG/DL (CALC)
LDLC SERPL CALC-MCNC: 50 MG/DL (CALC)
LEUKOCYTE ESTERASE UR QL STRIP: NEGATIVE
LYMPHOCYTES # BLD AUTO: 1369 CELLS/UL (ref 850–3900)
LYMPHOCYTES NFR BLD AUTO: 18.5 %
MCH RBC QN AUTO: 30.8 PG (ref 27–33)
MCHC RBC AUTO-ENTMCNC: 32.7 G/DL (ref 32–36)
MCV RBC AUTO: 94.1 FL (ref 80–100)
MICROALBUMIN UR-MCNC: 0.2 MG/DL
MONOCYTES # BLD AUTO: 696 CELLS/UL (ref 200–950)
MONOCYTES NFR BLD AUTO: 9.4 %
NEUTROPHILS # BLD AUTO: 5187 CELLS/UL (ref 1500–7800)
NEUTROPHILS NFR BLD AUTO: 70.1 %
NITRITE UR QL STRIP: NEGATIVE
NONHDLC SERPL-MCNC: 65 MG/DL (CALC)
NONHDLC SERPL-MCNC: 67 MG/DL (CALC)
PH UR STRIP: 5.5 [PH] (ref 5–8)
PHOSPHATE SERPL-MCNC: 4 MG/DL (ref 2.5–4.5)
PLATELET # BLD AUTO: 231 THOUSAND/UL (ref 140–400)
PMV BLD REES-ECKER: 11.4 FL (ref 7.5–12.5)
POTASSIUM SERPL-SCNC: 4.4 MMOL/L (ref 3.5–5.3)
POTASSIUM SERPL-SCNC: 4.9 MMOL/L (ref 3.5–5.3)
PROT SERPL-MCNC: 6.2 G/DL (ref 6.1–8.1)
PROT UR QL STRIP: NEGATIVE
PSA SERPL-MCNC: 1.81 NG/ML
RBC # BLD AUTO: 5.26 MILLION/UL (ref 4.2–5.8)
SODIUM SERPL-SCNC: 139 MMOL/L (ref 135–146)
SODIUM SERPL-SCNC: 139 MMOL/L (ref 135–146)
SP GR UR STRIP: 1.02 (ref 1–1.03)
TRIGL SERPL-MCNC: 86 MG/DL
TRIGL SERPL-MCNC: 89 MG/DL
TSH SERPL-ACNC: 1.59 MIU/L (ref 0.4–4.5)
TSH SERPL-ACNC: 1.65 MIU/L (ref 0.4–4.5)
URATE SERPL-MCNC: 3.9 MG/DL (ref 4–8)
VIT B12 SERPL-MCNC: 1957 PG/ML (ref 200–1100)
WBC # BLD AUTO: 7.4 THOUSAND/UL (ref 3.8–10.8)

## 2025-09-03 ENCOUNTER — APPOINTMENT (OUTPATIENT)
Dept: ENDOCRINOLOGY | Facility: CLINIC | Age: 60
End: 2025-09-03
Payer: COMMERCIAL

## 2025-09-03 ASSESSMENT — PATIENT HEALTH QUESTIONNAIRE - PHQ9
SUM OF ALL RESPONSES TO PHQ9 QUESTIONS 1 & 2: 0
1. LITTLE INTEREST OR PLEASURE IN DOING THINGS: NOT AT ALL
2. FEELING DOWN, DEPRESSED OR HOPELESS: NOT AT ALL

## 2025-09-05 ENCOUNTER — APPOINTMENT (OUTPATIENT)
Dept: OPHTHALMOLOGY | Facility: CLINIC | Age: 60
End: 2025-09-05
Payer: COMMERCIAL

## 2025-09-15 ENCOUNTER — APPOINTMENT (OUTPATIENT)
Dept: OPHTHALMOLOGY | Facility: CLINIC | Age: 60
End: 2025-09-15
Payer: COMMERCIAL

## 2025-12-17 ENCOUNTER — APPOINTMENT (OUTPATIENT)
Dept: PRIMARY CARE | Facility: CLINIC | Age: 60
End: 2025-12-17
Payer: COMMERCIAL

## 2026-04-15 ENCOUNTER — APPOINTMENT (OUTPATIENT)
Dept: ENDOCRINOLOGY | Facility: CLINIC | Age: 61
End: 2026-04-15
Payer: COMMERCIAL